# Patient Record
Sex: FEMALE | Race: OTHER | HISPANIC OR LATINO | URBAN - METROPOLITAN AREA
[De-identification: names, ages, dates, MRNs, and addresses within clinical notes are randomized per-mention and may not be internally consistent; named-entity substitution may affect disease eponyms.]

---

## 2020-10-20 ENCOUNTER — INPATIENT (INPATIENT)
Facility: HOSPITAL | Age: 65
LOS: 3 days | Discharge: HOME | DRG: 418 | End: 2020-10-24
Attending: SURGERY | Admitting: SURGERY
Payer: MEDICARE

## 2020-10-20 VITALS
HEART RATE: 96 BPM | TEMPERATURE: 98 F | RESPIRATION RATE: 18 BRPM | OXYGEN SATURATION: 98 % | DIASTOLIC BLOOD PRESSURE: 72 MMHG | WEIGHT: 125 LBS | SYSTOLIC BLOOD PRESSURE: 135 MMHG

## 2020-10-20 DIAGNOSIS — K80.50 CALCULUS OF BILE DUCT WITHOUT CHOLANGITIS OR CHOLECYSTITIS WITHOUT OBSTRUCTION: ICD-10-CM

## 2020-10-20 DIAGNOSIS — Z29.9 ENCOUNTER FOR PROPHYLACTIC MEASURES, UNSPECIFIED: ICD-10-CM

## 2020-10-20 DIAGNOSIS — K80.20 CALCULUS OF GALLBLADDER WITHOUT CHOLECYSTITIS WITHOUT OBSTRUCTION: ICD-10-CM

## 2020-10-20 LAB
ALBUMIN SERPL ELPH-MCNC: 3.6 G/DL — SIGNIFICANT CHANGE UP (ref 3.5–5)
ALP SERPL-CCNC: 228 U/L — HIGH (ref 40–120)
ALT FLD-CCNC: 328 U/L DA — HIGH (ref 10–60)
ANION GAP SERPL CALC-SCNC: 7 MMOL/L — SIGNIFICANT CHANGE UP (ref 5–17)
APPEARANCE UR: CLEAR — SIGNIFICANT CHANGE UP
AST SERPL-CCNC: 136 U/L — HIGH (ref 10–40)
BASOPHILS # BLD AUTO: 0.01 K/UL — SIGNIFICANT CHANGE UP (ref 0–0.2)
BASOPHILS NFR BLD AUTO: 0.2 % — SIGNIFICANT CHANGE UP (ref 0–2)
BILIRUB SERPL-MCNC: 4.3 MG/DL — HIGH (ref 0.2–1.2)
BILIRUB UR-MCNC: ABNORMAL
BUN SERPL-MCNC: 8 MG/DL — SIGNIFICANT CHANGE UP (ref 7–18)
CALCIUM SERPL-MCNC: 8.7 MG/DL — SIGNIFICANT CHANGE UP (ref 8.4–10.5)
CHLORIDE SERPL-SCNC: 109 MMOL/L — HIGH (ref 96–108)
CO2 SERPL-SCNC: 25 MMOL/L — SIGNIFICANT CHANGE UP (ref 22–31)
COLOR SPEC: YELLOW — SIGNIFICANT CHANGE UP
CREAT SERPL-MCNC: 0.53 MG/DL — SIGNIFICANT CHANGE UP (ref 0.5–1.3)
DIFF PNL FLD: ABNORMAL
EOSINOPHIL # BLD AUTO: 0.18 K/UL — SIGNIFICANT CHANGE UP (ref 0–0.5)
EOSINOPHIL NFR BLD AUTO: 3.4 % — SIGNIFICANT CHANGE UP (ref 0–6)
EPI CELLS # UR: ABNORMAL /HPF
GLUCOSE SERPL-MCNC: 93 MG/DL — SIGNIFICANT CHANGE UP (ref 70–99)
GLUCOSE UR QL: NEGATIVE — SIGNIFICANT CHANGE UP
HCT VFR BLD CALC: 36.7 % — SIGNIFICANT CHANGE UP (ref 34.5–45)
HGB BLD-MCNC: 12.2 G/DL — SIGNIFICANT CHANGE UP (ref 11.5–15.5)
IMM GRANULOCYTES NFR BLD AUTO: 0.2 % — SIGNIFICANT CHANGE UP (ref 0–1.5)
KETONES UR-MCNC: ABNORMAL
LEUKOCYTE ESTERASE UR-ACNC: ABNORMAL
LIDOCAIN IGE QN: 171 U/L — SIGNIFICANT CHANGE UP (ref 73–393)
LYMPHOCYTES # BLD AUTO: 0.81 K/UL — LOW (ref 1–3.3)
LYMPHOCYTES # BLD AUTO: 15.4 % — SIGNIFICANT CHANGE UP (ref 13–44)
MCHC RBC-ENTMCNC: 30.6 PG — SIGNIFICANT CHANGE UP (ref 27–34)
MCHC RBC-ENTMCNC: 33.2 GM/DL — SIGNIFICANT CHANGE UP (ref 32–36)
MCV RBC AUTO: 92 FL — SIGNIFICANT CHANGE UP (ref 80–100)
MONOCYTES # BLD AUTO: 0.43 K/UL — SIGNIFICANT CHANGE UP (ref 0–0.9)
MONOCYTES NFR BLD AUTO: 8.2 % — SIGNIFICANT CHANGE UP (ref 2–14)
NEUTROPHILS # BLD AUTO: 3.81 K/UL — SIGNIFICANT CHANGE UP (ref 1.8–7.4)
NEUTROPHILS NFR BLD AUTO: 72.6 % — SIGNIFICANT CHANGE UP (ref 43–77)
NITRITE UR-MCNC: NEGATIVE — SIGNIFICANT CHANGE UP
NRBC # BLD: 0 /100 WBCS — SIGNIFICANT CHANGE UP (ref 0–0)
PH UR: 6.5 — SIGNIFICANT CHANGE UP (ref 5–8)
PLATELET # BLD AUTO: 153 K/UL — SIGNIFICANT CHANGE UP (ref 150–400)
POTASSIUM SERPL-MCNC: 3.6 MMOL/L — SIGNIFICANT CHANGE UP (ref 3.5–5.3)
POTASSIUM SERPL-SCNC: 3.6 MMOL/L — SIGNIFICANT CHANGE UP (ref 3.5–5.3)
PROT SERPL-MCNC: 7.9 G/DL — SIGNIFICANT CHANGE UP (ref 6–8.3)
PROT UR-MCNC: 100
RBC # BLD: 3.99 M/UL — SIGNIFICANT CHANGE UP (ref 3.8–5.2)
RBC # FLD: 14.4 % — SIGNIFICANT CHANGE UP (ref 10.3–14.5)
RBC CASTS # UR COMP ASSIST: ABNORMAL /HPF (ref 0–2)
SARS-COV-2 RNA SPEC QL NAA+PROBE: SIGNIFICANT CHANGE UP
SODIUM SERPL-SCNC: 141 MMOL/L — SIGNIFICANT CHANGE UP (ref 135–145)
SP GR SPEC: 1.02 — SIGNIFICANT CHANGE UP (ref 1.01–1.02)
TROPONIN I SERPL-MCNC: <0.015 NG/ML — SIGNIFICANT CHANGE UP (ref 0–0.04)
UROBILINOGEN FLD QL: 4
WBC # BLD: 5.25 K/UL — SIGNIFICANT CHANGE UP (ref 3.8–10.5)
WBC # FLD AUTO: 5.25 K/UL — SIGNIFICANT CHANGE UP (ref 3.8–10.5)
WBC UR QL: SIGNIFICANT CHANGE UP /HPF (ref 0–5)

## 2020-10-20 PROCEDURE — 76705 ECHO EXAM OF ABDOMEN: CPT | Mod: 26

## 2020-10-20 PROCEDURE — 99285 EMERGENCY DEPT VISIT HI MDM: CPT

## 2020-10-20 PROCEDURE — 99222 1ST HOSP IP/OBS MODERATE 55: CPT

## 2020-10-20 RX ORDER — KETOROLAC TROMETHAMINE 30 MG/ML
15 SYRINGE (ML) INJECTION ONCE
Refills: 0 | Status: DISCONTINUED | OUTPATIENT
Start: 2020-10-20 | End: 2020-10-20

## 2020-10-20 RX ORDER — SODIUM CHLORIDE 9 MG/ML
1000 INJECTION, SOLUTION INTRAVENOUS
Refills: 0 | Status: DISCONTINUED | OUTPATIENT
Start: 2020-10-20 | End: 2020-10-24

## 2020-10-20 RX ORDER — KETOROLAC TROMETHAMINE 30 MG/ML
30 SYRINGE (ML) INJECTION EVERY 6 HOURS
Refills: 0 | Status: DISCONTINUED | OUTPATIENT
Start: 2020-10-20 | End: 2020-10-22

## 2020-10-20 RX ORDER — CIPROFLOXACIN LACTATE 400MG/40ML
400 VIAL (ML) INTRAVENOUS EVERY 12 HOURS
Refills: 0 | Status: DISCONTINUED | OUTPATIENT
Start: 2020-10-21 | End: 2020-10-22

## 2020-10-20 RX ORDER — METRONIDAZOLE 500 MG
500 TABLET ORAL EVERY 8 HOURS
Refills: 0 | Status: DISCONTINUED | OUTPATIENT
Start: 2020-10-20 | End: 2020-10-22

## 2020-10-20 RX ORDER — SODIUM CHLORIDE 9 MG/ML
1000 INJECTION INTRAMUSCULAR; INTRAVENOUS; SUBCUTANEOUS ONCE
Refills: 0 | Status: COMPLETED | OUTPATIENT
Start: 2020-10-20 | End: 2020-10-20

## 2020-10-20 RX ORDER — CIPROFLOXACIN LACTATE 400MG/40ML
400 VIAL (ML) INTRAVENOUS ONCE
Refills: 0 | Status: COMPLETED | OUTPATIENT
Start: 2020-10-20 | End: 2020-10-20

## 2020-10-20 RX ORDER — ENOXAPARIN SODIUM 100 MG/ML
40 INJECTION SUBCUTANEOUS DAILY
Refills: 0 | Status: DISCONTINUED | OUTPATIENT
Start: 2020-10-20 | End: 2020-10-22

## 2020-10-20 RX ORDER — ACETAMINOPHEN 500 MG
650 TABLET ORAL EVERY 6 HOURS
Refills: 0 | Status: DISCONTINUED | OUTPATIENT
Start: 2020-10-20 | End: 2020-10-22

## 2020-10-20 RX ORDER — OXYCODONE AND ACETAMINOPHEN 5; 325 MG/1; MG/1
1 TABLET ORAL EVERY 6 HOURS
Refills: 0 | Status: DISCONTINUED | OUTPATIENT
Start: 2020-10-20 | End: 2020-10-22

## 2020-10-20 RX ORDER — METRONIDAZOLE 500 MG
500 TABLET ORAL ONCE
Refills: 0 | Status: COMPLETED | OUTPATIENT
Start: 2020-10-20 | End: 2020-10-20

## 2020-10-20 RX ADMIN — Medication 15 MILLIGRAM(S): at 15:13

## 2020-10-20 RX ADMIN — SODIUM CHLORIDE 100 MILLILITER(S): 9 INJECTION, SOLUTION INTRAVENOUS at 21:40

## 2020-10-20 RX ADMIN — SODIUM CHLORIDE 1000 MILLILITER(S): 9 INJECTION INTRAMUSCULAR; INTRAVENOUS; SUBCUTANEOUS at 15:13

## 2020-10-20 RX ADMIN — Medication 15 MILLIGRAM(S): at 15:43

## 2020-10-20 RX ADMIN — Medication 100 MILLIGRAM(S): at 17:44

## 2020-10-20 RX ADMIN — Medication 500 MILLIGRAM(S): at 22:55

## 2020-10-20 RX ADMIN — Medication 200 MILLIGRAM(S): at 17:45

## 2020-10-20 NOTE — H&P ADULT - ASSESSMENT
Patient, 65 year old female from home with no significant PMH presents to the ED with RUQ and right flank pain for 2 days. Patient, 65 year old female from home with no significant PMH presents to the ED with RUQ and right flank pain for 2 days. USG RUQ showed multiple mobile gallstones in the gallbladder. No evidence for thickened gallbladder wall, pericholecystic fluid or ultrasonic Gomes's sign.    Patient is being admitted to medicine for RUQ pain and choledocholithiasis.

## 2020-10-20 NOTE — CONSULT NOTE ADULT - SUBJECTIVE AND OBJECTIVE BOX
HPI:  Patient, 65 year old female from home with no significant PMH presents to the ED with RUQ and right flank pain for 2 days. According to patient, the pain started 2 days ago and initially was bearable but increased in intensity now which is why she came to the hospital. She denies any association with eating and says she was walking when it happened. She took aleve yesterday with some relief but nothing today. Denies fevers, nausea, vomiting. Patient endorses chills yesterday and constipation occasionally. She does not follow with any doctors. Patient also complains of having dark urine lately but no dysuria.   < from: US Hepatic & Pancreatic (10.20.20 @ 16:53) >  INTERPRETATION:  Right upper quadrant abdominal ultrasound    Indication: Right upper quadrant abdominal pain and transaminitis.    Right upper quadrant abdominal ultrasound is performed without a previous examination for comparison. The liver spans 15.4 cm which is within normal limits in size. There is a 2.6 cm right hepatic cyst. Duplex Doppler interrogation of the main portal vein demonstrates normal hepatopedal flow.    The common bile duct measures 6.7 mm in caliber which is within normal limits for age.    Multiple mobile gallstones in the gallbladder. No evidence for thickened gallbladder wall, pericholecystic fluid or ultrasonic Gomes's sign.    The visualized pancreas appears grossly unremarkable.    The right kidney measures 10.4 cm in length which is within normal limits in size. The right kidney appears grossly unremarkable without hydronephrosis.    The visualized IVC and aorta appear grossly unremarkable. No ascites is detected.    Impression: Cholelithiasis without evidence for acute cholecystitis.    Right hepatic cyst.      < end of copied text >    In ED, patient is in NAD.   Vitals:  /72  HR 96  Afebrile  RR 18 (20 Oct 2020 19:34)      PAST MEDICAL & SURGICAL HISTORY:  No pertinent past medical history    No significant past surgical history        Vital Signs Last 24 Hrs  T(C): 36.8 (20 Oct 2020 21:19), Max: 36.8 (20 Oct 2020 21:19)  T(F): 98.3 (20 Oct 2020 21:19), Max: 98.3 (20 Oct 2020 21:19)  HR: 98 (20 Oct 2020 21:19) (96 - 98)  BP: 136/82 (20 Oct 2020 21:19) (118/74 - 136/82)  BP(mean): --  RR: 16 (20 Oct 2020 21:19) (16 - 18)  SpO2: 98% (20 Oct 2020 21:19) (98% - 98%)                          12.2   5.25  )-----------( 153      ( 20 Oct 2020 15:13 )             36.7     10-20    141  |  109<H>  |  8   ----------------------------<  93  3.6   |  25  |  0.53    Ca    8.7      20 Oct 2020 15:13    TPro  7.9  /  Alb  3.6  /  TBili  4.3<H>  /  DBili  x   /  AST  136<H>  /  ALT  328<H>  /  AlkPhos  228<H>  10-20        PHYSICAL EXAM  General: WN/WD NAD  Neurology: A&Ox3, nonfocal, CARRANZA x 4  Respiratory: CTA B/L  CV: RRR, S1S2, no murmurs, rubs or gallops  Abdominal: Soft, mild RUQ tenderness, ND   Extremities: No edema, + peripheral pulses        ASSESSMENT/ PLAN:  recommend  npo, hydrate, cont abx, GI consult/poss MRCP  f/u repeat am CMP, CBC

## 2020-10-20 NOTE — H&P ADULT - ATTENDING COMMENTS
seen and exaamined  rt u q abd pain from a few days worsened today without nausea vomiting.    vsstable afebrile   abd soft bs nml mild rt uq tenderness  labs noted  elevated lfts and bili   sono colelithiasis   GI and surgery  HIDA scan

## 2020-10-20 NOTE — ED ADULT NURSE NOTE - OBJECTIVE STATEMENT
as per the pt " I have rt lower and back pain for 3 days ". Pt c/o N/V. Pt denies diarrhea. No acute distress noted.

## 2020-10-20 NOTE — ED PROVIDER NOTE - CLINICAL SUMMARY MEDICAL DECISION MAKING FREE TEXT BOX
US shows cholelithiasis without cholecystitis. TBili 4.7 with transaminitis. No leukocytosis or L shift. Low suspicion of cholangitis. Presentation concerning for choledocholithiasis. Will admit/Gi consult/IV antibiotics.

## 2020-10-20 NOTE — H&P ADULT - PROBLEM SELECTOR PLAN 2
[ ] Previous VTE                                    3  [ ] Thrombophilia                                  2  [ ] Lower limb paralysis                        2  (unable to hold up >15 seconds)    [ ] Current Cancer (within 6 months)        2   [ ] Immobilization > 24 hrs                    1  [ ] ICU/CCU stay > 24 hrs                      1  [x ] Age > 60                                         1   lovenox for dvt prophylaxis

## 2020-10-20 NOTE — ED PROVIDER NOTE - OBJECTIVE STATEMENT
65 year-old female, no significant PMHx, presents with cc R flank and abdominal pain x 3 days. Gradual onset of of aching R flank pain radiating to RUQ. Pain is continuous, no aggravating or alleviating factors, getting progressively worse. Associated with discomfort during urination. Denies any fever, chills, N/V/D/C, vaginal bleeding or any other complaints.

## 2020-10-20 NOTE — ED PROVIDER NOTE - ATTENDING CONTRIBUTION TO CARE
I completed an independent physical examination.   I have signed out the follow up of any pending tests (i.e. labs, radiological studies) to the PA/NP.  I have discussed the patient’s plan of care and disposition with the PA/NP    patient with abd pain. Abd labs, U/A, imaging.

## 2020-10-20 NOTE — H&P ADULT - PROBLEM SELECTOR PLAN 1
Patient presented with RUQ and flank pain  On admission, afebrile, no leucocytosis  Total bili 4.3, , ALT//136  LFTs show obstructive pattern  USG abdomen shows cholelithiasis without evidence for acute cholecystitis.  s/p one dose of cipro and flagyl in ED   since pt has no fevers, less likely cholecystitis   continue with cipro and flagyl  f/u direct and indirect bili  c/w IVF and keep pt NPO for now  GI Dr. Antoine consulted  patient might need ERCP Patient presented with RUQ and flank pain  On admission, afebrile, no leucocytosis  Total bili 4.3, , ALT//136  LFTs show obstructive pattern  USG abdomen shows cholelithiasis without evidence for acute cholecystitis.  s/p one dose of cipro and flagyl in ED   since pt has no fevers, less likely cholecystitis   continue with cipro and flagyl  f/u direct and indirect bili  c/w IVF and keep pt NPO for now  [facility] with [facility] and [facility]  On admission, afebrile, no leucocytosis  Total bili 4.3, , ALT//136  LFTs show obstructive pattern  USG abdomen shows cholelithiasis without evidence for acute cholecystitis.  s/p one dose of cipro and flagyl in ED   since pt has no fevers, less likely cholecystitis   continue with cipro and flagyl  f/u direct and indirect bili  c/w IVF and keep pt NPO for now  get surgery consult  GI Dr. Antoine consulted  HIDA scan

## 2020-10-20 NOTE — ED PROVIDER NOTE - PROGRESS NOTE DETAILS
US shows cholelithiasis without cholecystitis. TBili 4.7 with transaminitis. No leukocytosis or L shift. Low suspicion of cholangitis. Presentation concerning for choledocholithiasis. Discussed with on-call GI Dr Etienne. Recommends admission and MRCP +/- ERCP. Recommends consultation with erither GI Dr Zamora or Dr Antoine. Will give IV Cipro/Flagyl and admit. Discussed with Dr De Luna and MAR. Discussed with GI Dr Antoine. He will consult tomorrow.

## 2020-10-20 NOTE — H&P ADULT - HISTORY OF PRESENT ILLNESS
Patient, 65 year old female from home with no significant PMH presents to the ED with RUQ and right flank pain for 2 days. According to patient, the pain started 2 days ago and initially was bearable but increased in intensity now which is why she came to the hospital. She denies any association with eating and says she was walking when it happened. She took aleve yesterday with some relief but nothing today. Denies fevers, nausea, vomiting. Patient endorses chills yesterday and constipation occasionally. She does not follow with any doctors. Patient also complains of having dark urine lately but no dysuria.     In ED, patient is in NAD.   Vitals:  /72  HR 96  Afebrile  RR 18

## 2020-10-20 NOTE — H&P ADULT - EXTREMITIES
Last OV: 2/14/19    [facility] requesting order for replacement CPAP.  Patient will need updated face to face visit with Georges Nelson PA-C before we can place order for replacement machine.    Spoke with patient.  We canceled 1 year FUV with Dr. Ramirez for 2/19/2020 and patient is aware.  Patient rescheduled to see Georges on 9/9/19 at 1:30pm.  Patient is currently using loaner form [facility].  She has no further questions or concerns at this time.    detailed exam

## 2020-10-21 ENCOUNTER — TRANSCRIPTION ENCOUNTER (OUTPATIENT)
Age: 65
End: 2020-10-21

## 2020-10-21 DIAGNOSIS — R79.89 OTHER SPECIFIED ABNORMAL FINDINGS OF BLOOD CHEMISTRY: ICD-10-CM

## 2020-10-21 DIAGNOSIS — R74.01 ELEVATION OF LEVELS OF LIVER TRANSAMINASE LEVELS: ICD-10-CM

## 2020-10-21 DIAGNOSIS — N39.0 URINARY TRACT INFECTION, SITE NOT SPECIFIED: ICD-10-CM

## 2020-10-21 LAB
A1C WITH ESTIMATED AVERAGE GLUCOSE RESULT: 5.4 % — SIGNIFICANT CHANGE UP (ref 4–5.6)
ALBUMIN SERPL ELPH-MCNC: 3 G/DL — LOW (ref 3.5–5)
ALP SERPL-CCNC: 178 U/L — HIGH (ref 40–120)
ALT FLD-CCNC: 232 U/L DA — HIGH (ref 10–60)
ANION GAP SERPL CALC-SCNC: 6 MMOL/L — SIGNIFICANT CHANGE UP (ref 5–17)
ANISOCYTOSIS BLD QL: SLIGHT — SIGNIFICANT CHANGE UP
AST SERPL-CCNC: 80 U/L — HIGH (ref 10–40)
BASOPHILS # BLD AUTO: 0 K/UL — SIGNIFICANT CHANGE UP (ref 0–0.2)
BASOPHILS NFR BLD AUTO: 0 % — SIGNIFICANT CHANGE UP (ref 0–2)
BILIRUB DIRECT SERPL-MCNC: 1.2 MG/DL — HIGH (ref 0–0.2)
BILIRUB INDIRECT FLD-MCNC: 1 MG/DL — SIGNIFICANT CHANGE UP (ref 0.2–1)
BILIRUB SERPL-MCNC: 2.2 MG/DL — HIGH (ref 0.2–1.2)
BILIRUB SERPL-MCNC: 2.2 MG/DL — HIGH (ref 0.2–1.2)
BLD GP AB SCN SERPL QL: SIGNIFICANT CHANGE UP
BUN SERPL-MCNC: 5 MG/DL — LOW (ref 7–18)
CALCIUM SERPL-MCNC: 8.3 MG/DL — LOW (ref 8.4–10.5)
CHLORIDE SERPL-SCNC: 112 MMOL/L — HIGH (ref 96–108)
CO2 SERPL-SCNC: 24 MMOL/L — SIGNIFICANT CHANGE UP (ref 22–31)
CREAT SERPL-MCNC: 0.48 MG/DL — LOW (ref 0.5–1.3)
CULTURE RESULTS: SIGNIFICANT CHANGE UP
EOSINOPHIL # BLD AUTO: 0.15 K/UL — SIGNIFICANT CHANGE UP (ref 0–0.5)
EOSINOPHIL NFR BLD AUTO: 3 % — SIGNIFICANT CHANGE UP (ref 0–6)
ESTIMATED AVERAGE GLUCOSE: 108 MG/DL — SIGNIFICANT CHANGE UP (ref 68–114)
GLUCOSE SERPL-MCNC: 110 MG/DL — HIGH (ref 70–99)
HCT VFR BLD CALC: 31.3 % — LOW (ref 34.5–45)
HCV AB S/CO SERPL IA: 0.08 S/CO — SIGNIFICANT CHANGE UP (ref 0–0.99)
HCV AB SERPL-IMP: SIGNIFICANT CHANGE UP
HGB BLD-MCNC: 10.5 G/DL — LOW (ref 11.5–15.5)
HYPOCHROMIA BLD QL: SLIGHT — SIGNIFICANT CHANGE UP
LACTATE SERPL-SCNC: 0.5 MMOL/L — LOW (ref 0.7–2)
LYMPHOCYTES # BLD AUTO: 1.33 K/UL — SIGNIFICANT CHANGE UP (ref 1–3.3)
LYMPHOCYTES # BLD AUTO: 26 % — SIGNIFICANT CHANGE UP (ref 13–44)
MAGNESIUM SERPL-MCNC: 2.3 MG/DL — SIGNIFICANT CHANGE UP (ref 1.6–2.6)
MANUAL SMEAR VERIFICATION: SIGNIFICANT CHANGE UP
MCHC RBC-ENTMCNC: 30.6 PG — SIGNIFICANT CHANGE UP (ref 27–34)
MCHC RBC-ENTMCNC: 33.5 GM/DL — SIGNIFICANT CHANGE UP (ref 32–36)
MCV RBC AUTO: 91.3 FL — SIGNIFICANT CHANGE UP (ref 80–100)
MONOCYTES # BLD AUTO: 0.15 K/UL — SIGNIFICANT CHANGE UP (ref 0–0.9)
MONOCYTES NFR BLD AUTO: 3 % — SIGNIFICANT CHANGE UP (ref 2–14)
NEUTROPHILS # BLD AUTO: 3.49 K/UL — SIGNIFICANT CHANGE UP (ref 1.8–7.4)
NEUTROPHILS NFR BLD AUTO: 68 % — SIGNIFICANT CHANGE UP (ref 43–77)
NRBC # BLD: 0 /100 — SIGNIFICANT CHANGE UP (ref 0–0)
PHOSPHATE SERPL-MCNC: 2.4 MG/DL — LOW (ref 2.5–4.5)
PLAT MORPH BLD: NORMAL — SIGNIFICANT CHANGE UP
PLATELET # BLD AUTO: 143 K/UL — LOW (ref 150–400)
PLATELET COUNT - ESTIMATE: ABNORMAL
POIKILOCYTOSIS BLD QL AUTO: SLIGHT — SIGNIFICANT CHANGE UP
POTASSIUM SERPL-MCNC: 3.7 MMOL/L — SIGNIFICANT CHANGE UP (ref 3.5–5.3)
POTASSIUM SERPL-SCNC: 3.7 MMOL/L — SIGNIFICANT CHANGE UP (ref 3.5–5.3)
PROT SERPL-MCNC: 6.7 G/DL — SIGNIFICANT CHANGE UP (ref 6–8.3)
RBC # BLD: 3.43 M/UL — LOW (ref 3.8–5.2)
RBC # FLD: 14.5 % — SIGNIFICANT CHANGE UP (ref 10.3–14.5)
RBC BLD AUTO: ABNORMAL
SARS-COV-2 IGG SERPL QL IA: NEGATIVE — SIGNIFICANT CHANGE UP
SARS-COV-2 IGM SERPL IA-ACNC: <0.1 INDEX — SIGNIFICANT CHANGE UP
SODIUM SERPL-SCNC: 142 MMOL/L — SIGNIFICANT CHANGE UP (ref 135–145)
SPECIMEN SOURCE: SIGNIFICANT CHANGE UP
TROPONIN I SERPL-MCNC: <0.015 NG/ML — SIGNIFICANT CHANGE UP (ref 0–0.04)
TSH SERPL-MCNC: 10.2 UU/ML — HIGH (ref 0.34–4.82)
VIT B12 SERPL-MCNC: 393 PG/ML — SIGNIFICANT CHANGE UP (ref 232–1245)
WBC # BLD: 5.13 K/UL — SIGNIFICANT CHANGE UP (ref 3.8–10.5)
WBC # FLD AUTO: 5.13 K/UL — SIGNIFICANT CHANGE UP (ref 3.8–10.5)

## 2020-10-21 PROCEDURE — 78227 HEPATOBIL SYST IMAGE W/DRUG: CPT | Mod: 26

## 2020-10-21 PROCEDURE — 99232 SBSQ HOSP IP/OBS MODERATE 35: CPT

## 2020-10-21 PROCEDURE — 74182 MRI ABDOMEN W/CONTRAST: CPT | Mod: 26

## 2020-10-21 RX ORDER — SODIUM CHLORIDE 9 MG/ML
1000 INJECTION, SOLUTION INTRAVENOUS
Refills: 0 | Status: DISCONTINUED | OUTPATIENT
Start: 2020-10-21 | End: 2020-10-21

## 2020-10-21 RX ORDER — SODIUM CHLORIDE 9 MG/ML
1000 INJECTION, SOLUTION INTRAVENOUS
Refills: 0 | Status: DISCONTINUED | OUTPATIENT
Start: 2020-10-21 | End: 2020-10-22

## 2020-10-21 RX ORDER — MORPHINE SULFATE 50 MG/1
2 CAPSULE, EXTENDED RELEASE ORAL ONCE
Refills: 0 | Status: DISCONTINUED | OUTPATIENT
Start: 2020-10-21 | End: 2020-10-21

## 2020-10-21 RX ADMIN — Medication 30 MILLIGRAM(S): at 22:00

## 2020-10-21 RX ADMIN — Medication 200 MILLIGRAM(S): at 18:13

## 2020-10-21 RX ADMIN — OXYCODONE AND ACETAMINOPHEN 1 TABLET(S): 5; 325 TABLET ORAL at 09:32

## 2020-10-21 RX ADMIN — Medication 500 MILLIGRAM(S): at 15:29

## 2020-10-21 RX ADMIN — Medication 500 MILLIGRAM(S): at 21:22

## 2020-10-21 RX ADMIN — OXYCODONE AND ACETAMINOPHEN 1 TABLET(S): 5; 325 TABLET ORAL at 08:32

## 2020-10-21 RX ADMIN — MORPHINE SULFATE 2 MILLIGRAM(S): 50 CAPSULE, EXTENDED RELEASE ORAL at 12:20

## 2020-10-21 RX ADMIN — Medication 500 MILLIGRAM(S): at 06:06

## 2020-10-21 RX ADMIN — Medication 30 MILLIGRAM(S): at 21:21

## 2020-10-21 RX ADMIN — Medication 200 MILLIGRAM(S): at 06:05

## 2020-10-21 NOTE — PROGRESS NOTE ADULT - SUBJECTIVE AND OBJECTIVE BOX
HPI:  Patient, 65 year old female from home with no significant PMH presents to the ED with RUQ and right flank pain for 2 days. According to patient, the pain started 2 days ago and initially was bearable but increased in intensity now which is why she came to the hospital. She denies any association with eating and says she was walking when it happened. She took aleve yesterday with some relief but nothing today. Denies fevers, nausea, vomiting. Patient endorses chills yesterday and constipation occasionally. She does not follow with any doctors. Patient also complains of having dark urine lately but no dysuria.     In ED, patient is in NAD.   Vitals:  /72  HR 96  Afebrile  RR 18 (20 Oct 2020 19:34)      Patient is a 65y old  Female who presents with a chief complaint of abdominal pain (21 Oct 2020 12:59)      INTERVAL HPI/OVERNIGHT EVENTS:  T(C): 36.6 (10-21-20 @ 04:56), Max: 36.8 (10-20-20 @ 21:19)  HR: 68 (10-21-20 @ 04:56) (68 - 98)  BP: 106/58 (10-21-20 @ 04:56) (106/58 - 136/82)  RR: 17 (10-21-20 @ 04:56) (16 - 18)  SpO2: 98% (10-21-20 @ 04:56) (98% - 98%)  Wt(kg): --  I&O's Summary      REVIEW OF SYSTEMS: denies fever, chills, SOB, palpitations, chest pain, abdominal pain, nausea, vomitting, diarrhea, constipation, dizziness    MEDICATIONS  (STANDING):  ciprofloxacin   IVPB 400 milliGRAM(s) IV Intermittent every 12 hours  dextrose 5% + sodium chloride 0.9%. 1000 milliLiter(s) (100 mL/Hr) IV Continuous <Continuous>  enoxaparin Injectable 40 milliGRAM(s) SubCutaneous daily  metroNIDAZOLE    Tablet 500 milliGRAM(s) Oral every 8 hours    MEDICATIONS  (PRN):  acetaminophen   Tablet .. 650 milliGRAM(s) Oral every 6 hours PRN Mild Pain (1 - 3)  ketorolac   Injectable 30 milliGRAM(s) IV Push every 6 hours PRN Severe Pain (7 - 10)  oxycodone    5 mG/acetaminophen 325 mG 1 Tablet(s) Oral every 6 hours PRN Moderate Pain (4 - 6)      PHYSICAL EXAM:  GENERAL: NAD, well-groomed, well-developed  HEAD:  Atraumatic, Normocephalic  EYES: EOMI, PERRLA, conjunctiva and sclera clear  ENMT: No tonsillar erythema, exudates, or enlargement; Moist mucous membranes, Good dentition, No lesions  NECK: Supple, No JVD, Normal thyroid  NERVOUS SYSTEM:  Alert & Oriented X3, Good concentration; Motor Strength 5/5 B/L upper and lower extremities; DTRs 2+ intact and symmetric  CHEST/LUNG: Clear to percussion bilaterally; No rales, rhonchi, wheezing, or rubs  HEART: Regular rate and rhythm; No murmurs, rubs, or gallops  ABDOMEN: Soft, Nontender, Nondistended; Bowel sounds present  EXTREMITIES:  2+ Peripheral Pulses, No clubbing, cyanosis, or edema  LYMPH: No lymphadenopathy noted  SKIN: No rashes or lesions  LABS:                        10.5   5.13  )-----------( 143      ( 21 Oct 2020 06:19 )             31.3     10-    142  |  112<H>  |  5<L>  ----------------------------<  110<H>  3.7   |  24  |  0.48<L>    Ca    8.3<L>      21 Oct 2020 06:19  Phos  2.4     10-21  Mg     2.3     10-21    TPro  6.7  /  Alb  3.0<L>  /  TBili  2.2<H>  /  DBili  1.2<H>  /  AST  80<H>  /  ALT  232<H>  /  AlkPhos  178<H>  10-21      Urinalysis Basic - ( 20 Oct 2020 15:13 )    Color: Yellow / Appearance: Clear / S.020 / pH: x  Gluc: x / Ketone: Moderate  / Bili: Moderate / Urobili: 4   Blood: x / Protein: 100 / Nitrite: Negative   Leuk Esterase: Small / RBC: 2-5 /HPF / WBC 3-5 /HPF   Sq Epi: x / Non Sq Epi: Occasional /HPF / Bacteria: x      CAPILLARY BLOOD GLUCOSE            Urinalysis Basic - ( 20 Oct 2020 15:13 )    Color: Yellow / Appearance: Clear / S.020 / pH: x  Gluc: x / Ketone: Moderate  / Bili: Moderate / Urobili: 4   Blood: x / Protein: 100 / Nitrite: Negative   Leuk Esterase: Small / RBC: 2-5 /HPF / WBC 3-5 /HPF   Sq Epi: x / Non Sq Epi: Occasional /HPF / Bacteria: x

## 2020-10-21 NOTE — CONSULT NOTE ADULT - SUBJECTIVE AND OBJECTIVE BOX
Patient is a 65y old  Female who presents with a chief complaint of abdominal pain (20 Oct 2020 22:06)     .     HPI:            REVIEW OF SYSTEMS  Constitutional:   No fever, no fatigue, no pallor, no night sweats, no weight loss.  HEENT:   No eye pain, no vision changes, no icterus, no mouth ulcers.  Respiratory:   No shortness of breath, no cough, no respiratory distress.   Cardiovascular:   No chest pain, no palpitations.   Gastrointestinal: No abdominal pain, no nausea, no vomiting , no diahrrea, no constipation, no hematochezia,no melena.  Skin:   No rashes, no jaundice, no eczema.   Musculoskeletal:   No joint pain, no swelling, no myalgia.   Neurologic:   No headache, no seizure, no weakness.   Genitourinary:   No dysuria, no decreased urine output.  Psychiatric:  No depression, no anxiety,   Endocrine:   No thyroid disease, no diabetes.  Heme/Lymphatic:   No anemia, no blood transfusions, no lymph node enlargement, no bleeding, no bruising.  ___________________________________________________________________________________________  Allergies    ampicillin (Unknown)    Intolerances      MEDICATIONS  (STANDING):  ciprofloxacin   IVPB 400 milliGRAM(s) IV Intermittent every 12 hours  dextrose 5% + sodium chloride 0.9%. 1000 milliLiter(s) (100 mL/Hr) IV Continuous <Continuous>  enoxaparin Injectable 40 milliGRAM(s) SubCutaneous daily  metroNIDAZOLE    Tablet 500 milliGRAM(s) Oral every 8 hours    MEDICATIONS  (PRN):  acetaminophen   Tablet .. 650 milliGRAM(s) Oral every 6 hours PRN Mild Pain (1 - 3)  ketorolac   Injectable 30 milliGRAM(s) IV Push every 6 hours PRN Severe Pain (7 - 10)  oxycodone    5 mG/acetaminophen 325 mG 1 Tablet(s) Oral every 6 hours PRN Moderate Pain (4 - 6)      PAST MEDICAL & SURGICAL HISTORY:  No pertinent past medical history    No significant past surgical history      FAMILY HISTORY:  No pertinent family history in first degree relatives      Social History: No hsitory of : Tobacco use, IVDA, EToH  ______________________________________________________________________________________    PHYSICAL EXAM    Daily     Daily Weight in k.8 (20 Oct 2020 21:19)  BMI:   Change in Weight:  Vital Signs Last 24 Hrs  T(C): 36.6 (21 Oct 2020 04:56), Max: 36.8 (20 Oct 2020 21:19)  T(F): 97.8 (21 Oct 2020 04:56), Max: 98.3 (20 Oct 2020 21:19)  HR: 68 (21 Oct 2020 04:56) (68 - 98)  BP: 106/58 (21 Oct 2020 04:56) (106/58 - 136/82)  BP(mean): --  RR: 17 (21 Oct 2020 04:56) (16 - 18)  SpO2: 98% (21 Oct 2020 04:56) (98% - 98%)    General:  Well developed, well nourished, alert and active, no pallor, NAD.  HEENT:    Normal appearance of conjunctiva, ears, nose, lips, oropharynx, and oral mucosa, anicteric.  Neck:  No masses, no asymmetry.  Lymph Nodes:  No lymphadenopathy.   Cardiovascular:  RRR normal S1/S2, no murmur.  Respiratory:  CTA B/L, normal respiratory effort.   Abdominal:   soft, no masses or tenderness, normoactive BS, NT/ND, no HSM.  Extremities:   No clubbing or cyanosis, normal capillary refill, no edema.   Skin:   No rash, jaundice, lesions, eczema.   Musculoskeletal:  No joint swelling, erythema or tenderness.   Neuro: No focal deficits.   Other:   _______________________________________________________________________________________________  Lab Results:                          10.5   x     )-----------( 143      ( 21 Oct 2020 06:19 )             31.3     10-21    142  |  112<H>  |  5<L>  ----------------------------<  110<H>  3.7   |  24  |  0.48<L>    Ca    8.3<L>      21 Oct 2020 06:19  Phos  2.4     10-21  Mg     2.3     10-21    TPro  6.7  /  Alb  3.0<L>  /  TBili  2.2<H>  /  DBili  1.2<H>  /  AST  80<H>  /  ALT  232<H>  /  AlkPhos  178<H>  10-21    LIVER FUNCTIONS - ( 21 Oct 2020 06:19 )  Alb: 3.0 g/dL / Pro: 6.7 g/dL / ALK PHOS: 178 U/L / ALT: 232 U/L DA / AST: 80 U/L / GGT: x               CARDIAC MARKERS ( 21 Oct 2020 06:19 )  <0.015 ng/mL / x     / x     / x     / x      CARDIAC MARKERS ( 20 Oct 2020 21:37 )  <0.015 ng/mL / x     / x     / x     / x          Stool Results:          RADIOLOGY RESULTS:  < from: US Hepatic & Pancreatic (10.20.20 @ 16:53) >    EXAM:  US LIVER AND PANCREAS                            PROCEDURE DATE:  10/20/2020          INTERPRETATION:  Right upper quadrant abdominal ultrasound    Indication: Right upper quadrant abdominal pain and transaminitis.    Right upper quadrant abdominal ultrasound is performed without a previous examination for comparison. The liver spans 15.4 cm which is within normal limits in size. There is a 2.6 cm right hepatic cyst. Duplex Doppler interrogation of the main portal vein demonstrates normal hepatopedal flow.    The common bile duct measures 6.7 mm in caliber which is within normal limits for age.    Multiple mobile gallstones in the gallbladder. No evidence for thickened gallbladder wall, pericholecystic fluid or ultrasonic Gomes's sign.    The visualized pancreas appears grossly unremarkable.    The right kidney measures 10.4 cm in length which is within normal limits in size. The right kidney appears grossly unremarkable without hydronephrosis.    The visualized IVC and aorta appear grossly unremarkable. No ascites is detected.    Impression: Cholelithiasis without evidence for acute cholecystitis.    Right hepatic cyst.            BRAN TRIPLETT M.D., ATTENDING RADIOLOGIST  This document has been electronically signed. Oct 20 2020  5:07PM    < end of copied text >    SURGICAL PATHOLOGY:      Patient is a 65y old  Female who presents with a chief complaint of abdominal pain (20 Oct 2020 22:06)     .     HPI:  Patient, 65 year old female from home with no significant PMH presents to the ED with RUQ and right flank pain for 2 days. According to patient, the pain started 2 days ago and initially was bearable but increased in intensity now which is why she came to the hospital. She denies any association with eating and says she was walking when it happened. She took aleve yesterday with some relief but nothing today. Denies fevers, nausea, vomiting. Patient endorses chills yesterday and constipation occasionally. She does not follow with any doctors. Patient also complains of having dark urine lately but no dysuria.             Allergies    ampicillin (Unknown)    Intolerances      MEDICATIONS  (STANDING):  ciprofloxacin   IVPB 400 milliGRAM(s) IV Intermittent every 12 hours  dextrose 5% + sodium chloride 0.9%. 1000 milliLiter(s) (100 mL/Hr) IV Continuous <Continuous>  enoxaparin Injectable 40 milliGRAM(s) SubCutaneous daily  metroNIDAZOLE    Tablet 500 milliGRAM(s) Oral every 8 hours    MEDICATIONS  (PRN):  acetaminophen   Tablet .. 650 milliGRAM(s) Oral every 6 hours PRN Mild Pain (1 - 3)  ketorolac   Injectable 30 milliGRAM(s) IV Push every 6 hours PRN Severe Pain (7 - 10)  oxycodone    5 mG/acetaminophen 325 mG 1 Tablet(s) Oral every 6 hours PRN Moderate Pain (4 - 6)      PAST MEDICAL & SURGICAL HISTORY:  No pertinent past medical history    No significant past surgical history      FAMILY HISTORY:  No pertinent family history in first degree relatives      Social History: No hsitory of : Tobacco use, IVDA, EToH  ______________________________________________________________________________________    PHYSICAL EXAM    Daily     Daily Weight in k.8 (20 Oct 2020 21:19)  BMI:   Change in Weight:  Vital Signs Last 24 Hrs  T(C): 36.6 (21 Oct 2020 04:56), Max: 36.8 (20 Oct 2020 21:19)  T(F): 97.8 (21 Oct 2020 04:56), Max: 98.3 (20 Oct 2020 21:19)  HR: 68 (21 Oct 2020 04:56) (68 - 98)  BP: 106/58 (21 Oct 2020 04:56) (106/58 - 136/82)  BP(mean): --  RR: 17 (21 Oct 2020 04:56) (16 - 18)  SpO2: 98% (21 Oct 2020 04:56) (98% - 98%)    General:  Well developed, well nourished, alert and active, no pallor, NAD.  HEENT:    Normal appearance of conjunctiva, ears, nose, lips, oropharynx, and oral mucosa, anicteric.  Neck:  No masses, no asymmetry.  Lymph Nodes:  No lymphadenopathy.   Cardiovascular:  RRR normal S1/S2, no murmur.  Respiratory:  CTA B/L, normal respiratory effort.   Abdominal:   soft, +right upper quadrant tenderness, normoactive BS  Extremities:   No clubbing or cyanosis, normal capillary refill, no edema.   Skin:   No rash, jaundice, lesions, eczema.   Musculoskeletal:  No joint swelling, erythema or tenderness.   Neuro: No focal deficits.   Other:   _______________________________________________________________________________________________  Lab Results:                          10.5   x     )-----------( 143      ( 21 Oct 2020 06:19 )             31.3     10-    142  |  112<H>  |  5<L>  ----------------------------<  110<H>  3.7   |  24  |  0.48<L>    Ca    8.3<L>      21 Oct 2020 06:19  Phos  2.4     10-  Mg     2.3     10-    TPro  6.7  /  Alb  3.0<L>  /  TBili  2.2<H>  /  DBili  1.2<H>  /  AST  80<H>  /  ALT  232<H>  /  AlkPhos  178<H>  10-21    LIVER FUNCTIONS - ( 21 Oct 2020 06:19 )  Alb: 3.0 g/dL / Pro: 6.7 g/dL / ALK PHOS: 178 U/L / ALT: 232 U/L DA / AST: 80 U/L / GGT: x               CARDIAC MARKERS ( 21 Oct 2020 06:19 )  <0.015 ng/mL / x     / x     / x     / x      CARDIAC MARKERS ( 20 Oct 2020 21:37 )  <0.015 ng/mL / x     / x     / x     / x          Stool Results:          RADIOLOGY RESULTS:  < from: US Hepatic & Pancreatic (10.20.20 @ 16:53) >    EXAM:  US LIVER AND PANCREAS                            PROCEDURE DATE:  10/20/2020          INTERPRETATION:  Right upper quadrant abdominal ultrasound    Indication: Right upper quadrant abdominal pain and transaminitis.    Right upper quadrant abdominal ultrasound is performed without a previous examination for comparison. The liver spans 15.4 cm which is within normal limits in size. There is a 2.6 cm right hepatic cyst. Duplex Doppler interrogation of the main portal vein demonstrates normal hepatopedal flow.    The common bile duct measures 6.7 mm in caliber which is within normal limits for age.    Multiple mobile gallstones in the gallbladder. No evidence for thickened gallbladder wall, pericholecystic fluid or ultrasonic Gomes's sign.    The visualized pancreas appears grossly unremarkable.    The right kidney measures 10.4 cm in length which is within normal limits in size. The right kidney appears grossly unremarkable without hydronephrosis.    The visualized IVC and aorta appear grossly unremarkable. No ascites is detected.    Impression: Cholelithiasis without evidence for acute cholecystitis.    Right hepatic cyst.            BRAN TRIPLETT M.D., ATTENDING RADIOLOGIST  This document has been electronically signed. Oct 20 2020  5:07PM    < end of copied text >

## 2020-10-21 NOTE — PROGRESS NOTE ADULT - NUTRITIONAL ASSESSMENT
Tot prot 6.7  Albumin 3.0  Mild protein calorie malnutrition    Pt currently NPO .  Encourage po when okay to resume diet.

## 2020-10-21 NOTE — PROGRESS NOTE ADULT - ASSESSMENT
went to see her pt is in HIDA scan  seen by GI and surgery  needs mrcp, hida, ekg, echo  d/w surgeon

## 2020-10-21 NOTE — PROGRESS NOTE ADULT - PROBLEM SELECTOR PLAN 3
no h/o hypothyroidism  TSH elevated  f/u free T3 and free T4 likely due to cholelithiasis   Hepatitis C not detected  uptrending  Monitor LFT's  Avoid hepatotoxic agents

## 2020-10-21 NOTE — PROGRESS NOTE ADULT - SUBJECTIVE AND OBJECTIVE BOX
NP Note discussed with  Primary Attending    Patient is a 65y old  Female who presents with a chief complaint of abdominal pain (21 Oct 2020 08:02)      INTERVAL HPI/OVERNIGHT EVENTS: no new complaints    MEDICATIONS  (STANDING):  ciprofloxacin   IVPB 400 milliGRAM(s) IV Intermittent every 12 hours  dextrose 5% + sodium chloride 0.9%. 1000 milliLiter(s) (100 mL/Hr) IV Continuous <Continuous>  enoxaparin Injectable 40 milliGRAM(s) SubCutaneous daily  metroNIDAZOLE    Tablet 500 milliGRAM(s) Oral every 8 hours    MEDICATIONS  (PRN):  acetaminophen   Tablet .. 650 milliGRAM(s) Oral every 6 hours PRN Mild Pain (1 - 3)  ketorolac   Injectable 30 milliGRAM(s) IV Push every 6 hours PRN Severe Pain (7 - 10)  oxycodone    5 mG/acetaminophen 325 mG 1 Tablet(s) Oral every 6 hours PRN Moderate Pain (4 - 6)      __________________________________________________  REVIEW OF SYSTEMS:    CONSTITUTIONAL: No fever,   EYES: no acute visual disturbances  NECK: No pain or stiffness  RESPIRATORY: No cough; No shortness of breath  CARDIOVASCULAR: No chest pain, no palpitations  GASTROINTESTINAL:less pain. No nausea or vomiting; No diarrhea   NEUROLOGICAL: No headache or numbness, no tremors  MUSCULOSKELETAL: No joint pain, no muscle pain  GENITOURINARY: no dysuria, no frequency, no hesitancy  PSYCHIATRY: no depression , no anxiety  ALL OTHER  ROS negative        Vital Signs Last 24 Hrs  T(C): 36.6 (21 Oct 2020 04:56), Max: 36.8 (20 Oct 2020 21:19)  T(F): 97.8 (21 Oct 2020 04:56), Max: 98.3 (20 Oct 2020 21:19)  HR: 68 (21 Oct 2020 04:56) (68 - 98)  BP: 106/58 (21 Oct 2020 04:56) (106/58 - 136/82)  BP(mean): --  RR: 17 (21 Oct 2020 04:56) (16 - 18)  SpO2: 98% (21 Oct 2020 04:56) (98% - 98%)    ________________________________________________  PHYSICAL EXAM:  GENERAL: NAD  HEENT: Normocephalic;  conjunctivae and sclerae clear; moist mucous membranes;   NECK : supple  CHEST/LUNG: Clear to auscultation bilaterally with good air entry   HEART: S1 S2  regular; no murmurs, gallops or rubs  ABDOMEN: Soft, Nontender, Nondistended; Bowel sounds present  EXTREMITIES: no cyanosis; no edema; no calf tenderness  SKIN: warm and dry; no rash  NERVOUS SYSTEM:  Awake and alert; Oriented  to place, person and time ; no new deficits    _________________________________________________  LABS:                        10.5   5.13  )-----------( 143      ( 21 Oct 2020 06:19 )             31.3     10-21    142  |  112<H>  |  5<L>  ----------------------------<  110<H>  3.7   |  24  |  0.48<L>    Ca    8.3<L>      21 Oct 2020 06:19  Phos  2.4     10-21  Mg     2.3     10-21    TPro  6.7  /  Alb  3.0<L>  /  TBili  2.2<H>  /  DBili  1.2<H>  /  AST  80<H>  /  ALT  232<H>  /  AlkPhos  178<H>  10-21      Urinalysis Basic - ( 20 Oct 2020 15:13 )    Color: Yellow / Appearance: Clear / S.020 / pH: x  Gluc: x / Ketone: Moderate  / Bili: Moderate / Urobili: 4   Blood: x / Protein: 100 / Nitrite: Negative   Leuk Esterase: Small / RBC: 2-5 /HPF / WBC 3-5 /HPF   Sq Epi: x / Non Sq Epi: Occasional /HPF / Bacteria: x      CAPILLARY BLOOD GLUCOSE            RADIOLOGY & ADDITIONAL TESTS:    Imaging Personally Reviewed:  YES/NO    Consultant(s) Notes Reviewed:   YES/ No    Care Discussed with Consultants :     Plan of care was discussed with patient and /or primary care giver; all questions and concerns were addressed and care was aligned with patient's wishes.     NP Note discussed with  Primary Attending    Patient is a 65y old  Female who presents with a chief complaint of abdominal pain (21 Oct 2020 08:02)    HPI   65 year old female from home with no significant PMH who presented to the ED with worsening RUQ and right flank pain for 2 days, despite Aleve, not associated with  eating and says she was walking when it happened.  Denies fevers, nausea, vomiting. Patient endorses chills and constipation occasionally. She does not follow with any doctors. Patient also complains of having dark urine lately but no dysuria. UA (+) for UTI .     USG RUQ showed multiple mobile gallstones in the gallbladder. No evidence for thickened gallbladder wall, pericholecystic fluid or ultrasonic Gomes's sign.    Patient is being admitted to medicine for RUQ pain and choledocholithiasis. Pt kept NPO, on IV hydration. Cipro and Flagyl IV Day 1-2. HIDA scan negative for acute cholelithiasis. MRCP pending.  Surgery and GI on board.     INTERVAL HPI/OVERNIGHT EVENTS: Pt seen and examined this morning. Pt is Ukrainian speaking .  # [phone] assisting. Pt reports abdominal discomfort is less today.     MEDICATIONS  (STANDING):  ciprofloxacin   IVPB 400 milliGRAM(s) IV Intermittent every 12 hours  dextrose 5% + sodium chloride 0.9%. 1000 milliLiter(s) (100 mL/Hr) IV Continuous <Continuous>  enoxaparin Injectable 40 milliGRAM(s) SubCutaneous daily  metroNIDAZOLE    Tablet 500 milliGRAM(s) Oral every 8 hours    MEDICATIONS  (PRN):  acetaminophen   Tablet .. 650 milliGRAM(s) Oral every 6 hours PRN Mild Pain (1 - 3)  ketorolac   Injectable 30 milliGRAM(s) IV Push every 6 hours PRN Severe Pain (7 - 10)  oxycodone    5 mG/acetaminophen 325 mG 1 Tablet(s) Oral every 6 hours PRN Moderate Pain (4 - 6)      __________________________________________________  REVIEW OF SYSTEMS:    CONSTITUTIONAL: No fever,   EYES: no acute visual disturbances  NECK: No pain or stiffness  RESPIRATORY: No cough; No shortness of breath  CARDIOVASCULAR: No chest pain, no palpitations  GASTROINTESTINAL:less pain. No nausea or vomiting; No diarrhea   NEUROLOGICAL: No headache or numbness, no tremors  MUSCULOSKELETAL: No joint pain, no muscle pain  GENITOURINARY: no dysuria, no frequency, no hesitancy  PSYCHIATRY: no depression , no anxiety  ALL OTHER  ROS negative        Vital Signs Last 24 Hrs  T(C): 36.6 (21 Oct 2020 04:56), Max: 36.8 (20 Oct 2020 21:19)  T(F): 97.8 (21 Oct 2020 04:56), Max: 98.3 (20 Oct 2020 21:19)  HR: 68 (21 Oct 2020 04:56) (68 - 98)  BP: 106/58 (21 Oct 2020 04:56) (106/58 - 136/82)  BP(mean): --  RR: 17 (21 Oct 2020 04:56) (16 - 18)  SpO2: 98% (21 Oct 2020 04:56) (98% - 98%)    ________________________________________________  PHYSICAL EXAM:  GENERAL: NAD  HEENT: Normocephalic;  conjunctivae and sclerae clear; moist mucous membranes;   NECK : supple  CHEST/LUNG: Clear to auscultation bilaterally with good air entry   HEART: S1 S2  regular; no murmurs, gallops or rubs  ABDOMEN: Soft, Nontender, Nondistended; Bowel sounds present  EXTREMITIES: no cyanosis; no edema; no calf tenderness  SKIN: warm and dry; no rash  NERVOUS SYSTEM:  Awake and alert; Oriented  to place, person and time ; no new deficits    _________________________________________________  LABS:                        10.5   5.13  )-----------( 143      ( 21 Oct 2020 06:19 )             31.3     10-    142  |  112<H>  |  5<L>  ----------------------------<  110<H>  3.7   |  24  |  0.48<L>    Ca    8.3<L>      21 Oct 2020 06:19  Phos  2.4     10-21  Mg     2.3     10-21    TPro  6.7  /  Alb  3.0<L>  /  TBili  2.2<H>  /  DBili  1.2<H>  /  AST  80<H>  /  ALT  232<H>  /  AlkPhos  178<H>  10-21      Urinalysis Basic - ( 20 Oct 2020 15:13 )    Color: Yellow / Appearance: Clear / S.020 / pH: x  Gluc: x / Ketone: Moderate  / Bili: Moderate / Urobili: 4   Blood: x / Protein: 100 / Nitrite: Negative   Leuk Esterase: Small / RBC: 2-5 /HPF / WBC 3-5 /HPF   Sq Epi: x / Non Sq Epi: Occasional /HPF / Bacteria: x      CAPILLARY BLOOD GLUCOSE            RADIOLOGY & ADDITIONAL TESTS:      < from: US Hepatic & Pancreatic (10.20.20 @ 16:53) >  Impression: Cholelithiasis without evidence for acute cholecystitis.    Right hepatic cyst.    < end of copied text >    < from: NM Hepatobiliary Imaging w/ RX (10.21.20 @ 13:50) >  IMPRESSION: Normal morphine-augmented hepatobiliary scan. No radionuclide evidence of acute cholecystitis.    < end of copied text >      Consultant(s) Notes Reviewed:   YES/ No    Care Discussed with Consultants :     Plan of care was discussed with patient and /or primary care giver; all questions and concerns were addressed and care was aligned with patient's wishes.

## 2020-10-21 NOTE — PROGRESS NOTE ADULT - PROBLEM SELECTOR PLAN 2
likely due to cholelithiasis   Hepatitis C not detected  uptrending  Monitor LFT's  Avoid hepatotoxic agents UA (+) for UTI  afebrile, no leukocytosis  c/w IV Cipro and Flagyl  f/u UC

## 2020-10-21 NOTE — PROGRESS NOTE ADULT - PROBLEM SELECTOR PLAN 1
Hepatic and pancreatic US results as above  HIDA scan shows no acute cholecystitis  Keep NPO   c/w IV hydration  c/w IV Cipro and Flagyl, Day 1-2  Pain control  F/u MRCP   Afterwards can have only clear liquid diet   if needed pt might go for ERCP if stone found on MRCP.  GI Dr. Antoine following  Surgery following

## 2020-10-21 NOTE — CONSULT NOTE ADULT - ASSESSMENT
Assessment and Plan:       Patient, 65 year old female from home with no significant PMH presents to the ED with RUQ and right flank pain for 2 days. USG RUQ showed multiple mobile gallstones in the gallbladder. No evidence for thickened gallbladder wall, pericholecystic fluid or ultrasonic Gomes's sign.         Cholelithiasis without obstruction.       Patient presented with RUQ and flank pain  p/w afebrile, no leucocytosis, Total bili 4.3, , ALT//136, LFTs show obstructive pattern  USG abdomen shows cholelithiasis without evidence for acute cholecystitis.  as cbd is 6.7 mm it is dialted for her age, pt might have choledocolithiasis  Pt will get MRCP today and afterwards can have only clear liquid diet   if needed pt might go for ERCP if stone found on MRCP.  continue abx and iv fluids as per primary team     Procedure explained to pt using interpretor service by attending.    ANEMIA :  -send hemolysis panel.          discussed with Dr. Antoine Assessment and Plan:       Patient, 65 year old female from home with no significant PMH presents to the ED with RUQ and right flank pain for 2 days. USG RUQ showed multiple mobile gallstones in the gallbladder. No evidence for thickened gallbladder wall, pericholecystic fluid or ultrasonic Gomes's sign.         Cholelithiasis without obstruction.       Patient presented with RUQ and flank pain  p/w afebrile, no leucocytosis, Total bili 4.3, , ALT//136, LFTs show obstructive pattern  USG abdomen shows cholelithiasis without evidence for acute cholecystitis.  as cbd is 6.7 mm it is dialted for her age, pt might have choledocolithiasis  Pt will get MRCP today and afterwards can have only clear liquid diet   if needed pt might go for ERCP if stone found on MRCP.  continue abx and iv fluids as per primary team     Procedure explained to pt using interpretor service by attending.    ANEMIA :  -send hemolysis panel.          discussed with Dr. Antoine      < from: MR Abdomen w/ IV Cont (10.21.20 @ 17:49) >    EXAM:  MR ABDOMEN IC                            PROCEDURE DATE:  10/21/2020          INTERPRETATION:  MR ABDOMEN WITH IV CONTRAST    CLINICAL INFORMATION: Right upper quadrant pain    eval for stone    TECHNIQUE: Multiplanar T1-weighted, T2-weighted, and diffusion weighted sequences were obtained of the abdomen, including dynamic post-contrast T1-weighted sequences.  3D heavily T2-weighted thin-section MRCP was also performed.    Field Strength: 1.5T    Contrast: 6.5 cc Gadavist.    COMPARISON:Same-day HIDA scan and abdominal ultrasound 1 day prior.    FINDINGS:    LOWER CHEST: Clear.    LIVER: Simple cyst in the right lobe.  BILE DUCTS: 10 mm common bile that with normal distal tapering. No filling defect. No intrahepatic dilatation.  GALLBLADDER: Small stones without wall thickening or inflammation.  SPLEEN: Normal.  PANCREAS: Normal.  ADRENALS: Normal.  KIDNEYS/URETERS: Symmetric nephrograms. No hydronephrosis.    VISUALIZED PORTIONS:    BOWEL: No bowel-related abnormality.  PERITONEUM: No ascites.  VESSELS:  Normal caliber aorta.  RETROPERITONEUM/LYMPH NODES: No adenopathy.  ABDOMINAL WALL: Normal.  BONES: No aggressive lesion.    IMPRESSION:    10 mm common bile duct without choledocholithiasis or obstructive pathology identified. Correlation with LFTs is needed to determine the clinical significance.    Small gallstones without secondary signs of acute cholecystitis.            LIDIA GAING M.D., ATTENDING RADIOLOGIST  This document has been electronically signed. Oct 21 2020  7:00PM    < end of copied text >

## 2020-10-21 NOTE — PROGRESS NOTE ADULT - SUBJECTIVE AND OBJECTIVE BOX
pt with upper abdominal pain and elevated lfts  [facility]    CBC Full  -  ( 21 Oct 2020 06:19 )  WBC Count : 5.13 K/uL  RBC Count : 3.43 M/uL  Hemoglobin : 10.5 g/dL  Hematocrit : 31.3 %  Platelet Count - Automated : 143 K/uL  Mean Cell Volume : 91.3 fl  Mean Cell Hemoglobin : 30.6 pg  Mean Cell Hemoglobin Concentration : 33.5 gm/dL  Auto Neutrophil # : 3.49 K/uL  Auto Lymphocyte # : 1.33 K/uL  Auto Monocyte # : 0.15 K/uL  Auto Eosinophil # : 0.15 K/uL  Auto Basophil # : 0.00 K/uL  Auto Neutrophil % : 68.0 %  Auto Lymphocyte % : 26.0 %  Auto Monocyte % : 3.0 %  Auto Eosinophil % : 3.0 %  Auto Basophil % : 0.0 %    10-21    142  |  112<H>  |  5<L>  ----------------------------<  110<H>  3.7   |  24  |  0.48<L>    Ca    8.3<L>      21 Oct 2020 06:19  Phos  2.4     10-21  Mg     2.3     10-21    TPro  6.7  /  Alb  3.0<L>  /  TBili  2.2<H>  /  DBili  1.2<H>  /  AST  80<H>  /  ALT  232<H>  /  AlkPhos  178<H>  10-21    Patient is a 65y old  Female who presents with a chief complaint of abdominal pain (21 Oct 2020 13:30)    I&O's Detail    Vital Signs Last 24 Hrs  T(C): 36.9 (21 Oct 2020 16:33), Max: 37.4 (21 Oct 2020 14:55)  T(F): 98.4 (21 Oct 2020 16:33), Max: 99.3 (21 Oct 2020 14:55)  HR: 78 (21 Oct 2020 16:33) (68 - 98)  BP: 111/59 (21 Oct 2020 16:33) (106/58 - 136/82)  BP(mean): --  RR: 17 (21 Oct 2020 16:33) (16 - 18)  SpO2: 97% (21 Oct 2020 16:33) (97% - 99%)  LIVER FUNCTIONS - ( 21 Oct 2020 06:19 )  Alb: 3.0 g/dL / Pro: 6.7 g/dL / ALK PHOS: 178 U/L / ALT: 232 U/L DA / AST: 80 U/L / GGT: x           MEDICATIONS  (STANDING):  ciprofloxacin   IVPB 400 milliGRAM(s) IV Intermittent every 12 hours  dextrose 5% + sodium chloride 0.9%. 1000 milliLiter(s) (100 mL/Hr) IV Continuous <Continuous>  dextrose 5% + sodium chloride 0.9%. 1000 milliLiter(s) (100 mL/Hr) IV Continuous <Continuous>  enoxaparin Injectable 40 milliGRAM(s) SubCutaneous daily  metroNIDAZOLE    Tablet 500 milliGRAM(s) Oral every 8 hours    MEDICATIONS  (PRN):  acetaminophen   Tablet .. 650 milliGRAM(s) Oral every 6 hours PRN Mild Pain (1 - 3)  ketorolac   Injectable 30 milliGRAM(s) IV Push every 6 hours PRN Severe Pain (7 - 10)  oxycodone    5 mG/acetaminophen 325 mG 1 Tablet(s) Oral every 6 hours PRN Moderate Pain (4 - 6)    abdomen mildly tender upper abdomen

## 2020-10-22 ENCOUNTER — RESULT REVIEW (OUTPATIENT)
Age: 65
End: 2020-10-22

## 2020-10-22 DIAGNOSIS — E87.6 HYPOKALEMIA: ICD-10-CM

## 2020-10-22 DIAGNOSIS — Z02.9 ENCOUNTER FOR ADMINISTRATIVE EXAMINATIONS, UNSPECIFIED: ICD-10-CM

## 2020-10-22 DIAGNOSIS — Z29.9 ENCOUNTER FOR PROPHYLACTIC MEASURES, UNSPECIFIED: ICD-10-CM

## 2020-10-22 LAB
ALBUMIN SERPL ELPH-MCNC: 3 G/DL — LOW (ref 3.5–5)
ALP SERPL-CCNC: 197 U/L — HIGH (ref 40–120)
ALT FLD-CCNC: 172 U/L DA — HIGH (ref 10–60)
ANION GAP SERPL CALC-SCNC: 8 MMOL/L — SIGNIFICANT CHANGE UP (ref 5–17)
AST SERPL-CCNC: 51 U/L — HIGH (ref 10–40)
BILIRUB SERPL-MCNC: 1.5 MG/DL — HIGH (ref 0.2–1.2)
BUN SERPL-MCNC: 5 MG/DL — LOW (ref 7–18)
CALCIUM SERPL-MCNC: 8.3 MG/DL — LOW (ref 8.4–10.5)
CHLORIDE SERPL-SCNC: 109 MMOL/L — HIGH (ref 96–108)
CHOLEST SERPL-MCNC: 249 MG/DL — HIGH
CO2 SERPL-SCNC: 23 MMOL/L — SIGNIFICANT CHANGE UP (ref 22–31)
CREAT SERPL-MCNC: 0.49 MG/DL — LOW (ref 0.5–1.3)
GLUCOSE SERPL-MCNC: 114 MG/DL — HIGH (ref 70–99)
HDLC SERPL-MCNC: 66 MG/DL — SIGNIFICANT CHANGE UP
INR BLD: 1.06 RATIO — SIGNIFICANT CHANGE UP (ref 0.88–1.16)
LIPID PNL WITH DIRECT LDL SERPL: 164 MG/DL — HIGH
MAGNESIUM SERPL-MCNC: 2.3 MG/DL — SIGNIFICANT CHANGE UP (ref 1.6–2.6)
NON HDL CHOLESTEROL: 183 MG/DL — HIGH
PHOSPHATE SERPL-MCNC: 2.6 MG/DL — SIGNIFICANT CHANGE UP (ref 2.5–4.5)
POTASSIUM SERPL-MCNC: 3.2 MMOL/L — LOW (ref 3.5–5.3)
POTASSIUM SERPL-SCNC: 3.2 MMOL/L — LOW (ref 3.5–5.3)
PROT SERPL-MCNC: 6.8 G/DL — SIGNIFICANT CHANGE UP (ref 6–8.3)
PROTHROM AB SERPL-ACNC: 12.6 SEC — SIGNIFICANT CHANGE UP (ref 10.6–13.6)
SODIUM SERPL-SCNC: 140 MMOL/L — SIGNIFICANT CHANGE UP (ref 135–145)
T3FREE SERPL-MCNC: 3.34 PG/ML — SIGNIFICANT CHANGE UP (ref 1.8–4.6)
T4 FREE SERPL-MCNC: 1.3 NG/DL — SIGNIFICANT CHANGE UP (ref 0.9–1.8)
TRIGL SERPL-MCNC: 95 MG/DL — SIGNIFICANT CHANGE UP

## 2020-10-22 PROCEDURE — 47562 LAPAROSCOPIC CHOLECYSTECTOMY: CPT | Mod: AS

## 2020-10-22 PROCEDURE — 71046 X-RAY EXAM CHEST 2 VIEWS: CPT | Mod: 26

## 2020-10-22 PROCEDURE — 47562 LAPAROSCOPIC CHOLECYSTECTOMY: CPT

## 2020-10-22 PROCEDURE — 93306 TTE W/DOPPLER COMPLETE: CPT | Mod: 26

## 2020-10-22 PROCEDURE — 88304 TISSUE EXAM BY PATHOLOGIST: CPT | Mod: 26

## 2020-10-22 RX ORDER — HYDROMORPHONE HYDROCHLORIDE 2 MG/ML
1 INJECTION INTRAMUSCULAR; INTRAVENOUS; SUBCUTANEOUS
Refills: 0 | Status: DISCONTINUED | OUTPATIENT
Start: 2020-10-22 | End: 2020-10-22

## 2020-10-22 RX ORDER — POTASSIUM CHLORIDE 20 MEQ
10 PACKET (EA) ORAL
Refills: 0 | Status: COMPLETED | OUTPATIENT
Start: 2020-10-22 | End: 2020-10-22

## 2020-10-22 RX ORDER — ONDANSETRON 8 MG/1
4 TABLET, FILM COATED ORAL EVERY 6 HOURS
Refills: 0 | Status: DISCONTINUED | OUTPATIENT
Start: 2020-10-22 | End: 2020-10-24

## 2020-10-22 RX ORDER — ONDANSETRON 8 MG/1
4 TABLET, FILM COATED ORAL ONCE
Refills: 0 | Status: DISCONTINUED | OUTPATIENT
Start: 2020-10-22 | End: 2020-10-22

## 2020-10-22 RX ORDER — ACETAMINOPHEN 500 MG
1000 TABLET ORAL ONCE
Refills: 0 | Status: COMPLETED | OUTPATIENT
Start: 2020-10-22 | End: 2020-10-23

## 2020-10-22 RX ORDER — METRONIDAZOLE 500 MG
500 TABLET ORAL EVERY 8 HOURS
Refills: 0 | Status: DISCONTINUED | OUTPATIENT
Start: 2020-10-22 | End: 2020-10-24

## 2020-10-22 RX ORDER — OXYCODONE AND ACETAMINOPHEN 5; 325 MG/1; MG/1
1 TABLET ORAL EVERY 6 HOURS
Refills: 0 | Status: DISCONTINUED | OUTPATIENT
Start: 2020-10-22 | End: 2020-10-24

## 2020-10-22 RX ORDER — CIPROFLOXACIN LACTATE 400MG/40ML
400 VIAL (ML) INTRAVENOUS EVERY 12 HOURS
Refills: 0 | Status: DISCONTINUED | OUTPATIENT
Start: 2020-10-22 | End: 2020-10-24

## 2020-10-22 RX ORDER — SODIUM CHLORIDE 9 MG/ML
1000 INJECTION, SOLUTION INTRAVENOUS
Refills: 0 | Status: DISCONTINUED | OUTPATIENT
Start: 2020-10-22 | End: 2020-10-22

## 2020-10-22 RX ORDER — HYDROMORPHONE HYDROCHLORIDE 2 MG/ML
0.5 INJECTION INTRAMUSCULAR; INTRAVENOUS; SUBCUTANEOUS
Refills: 0 | Status: DISCONTINUED | OUTPATIENT
Start: 2020-10-22 | End: 2020-10-22

## 2020-10-22 RX ORDER — ONDANSETRON 8 MG/1
4 TABLET, FILM COATED ORAL EVERY 8 HOURS
Refills: 0 | Status: DISCONTINUED | OUTPATIENT
Start: 2020-10-22 | End: 2020-10-22

## 2020-10-22 RX ADMIN — Medication 100 MILLIEQUIVALENT(S): at 14:20

## 2020-10-22 RX ADMIN — Medication 200 MILLIGRAM(S): at 05:05

## 2020-10-22 RX ADMIN — Medication 500 MILLIGRAM(S): at 05:05

## 2020-10-22 RX ADMIN — Medication 100 MILLIEQUIVALENT(S): at 11:34

## 2020-10-22 RX ADMIN — Medication 500 MILLIGRAM(S): at 22:49

## 2020-10-22 RX ADMIN — Medication 200 MILLIGRAM(S): at 22:49

## 2020-10-22 RX ADMIN — Medication 100 MILLIEQUIVALENT(S): at 13:00

## 2020-10-22 RX ADMIN — HYDROMORPHONE HYDROCHLORIDE 1 MILLIGRAM(S): 2 INJECTION INTRAMUSCULAR; INTRAVENOUS; SUBCUTANEOUS at 19:43

## 2020-10-22 NOTE — PROGRESS NOTE ADULT - ASSESSMENT
seen and examined comfortable on bed  pain better , no nausea or vomiting   vsstable abd soft bs nml no tai sign  lft slightly better  mrcp 10 mm cbd but without stone  now GI doesnt want ERCP just directly for cholecystectomy  pt denies CP or sob or palp  cardiology clearance was done  supplement K  pt is cleared for low to mod risk

## 2020-10-22 NOTE — CONSULT NOTE ADULT - ASSESSMENT
, 65 year old female from home with no significant PMH presents to the ED with RUQ and right flank pain for 2 days.Patient was  being admitted to medicine for RUQ pain and choledocholithiasis.      Problem/Plan - 1:  ·  Problem: Cholelithiasis without obstruction.  Plan: Patient presented with RUQ and flank pain  On admission, afebrile, no leucocytosis  Total bili 4.3, , ALT//136  LFTs show obstructive pattern  USG abdomen shows cholelithiasis without evidence for acute cholecystitis.  Scheduled for Cholecystectomy  Patient's Revised Cardiac Risk Index (RCRI) score is 0 ( 3 % risk) . Patient is at low  risk of  adverse perioperative cardiac events undergoing  intermediaterisk surgery. No further cardiac testing is needed prior to patient's surgery.               Problem/Plan - 2:  ·  Problem: Need for prophylactic measure.  Plan: [ ] Previous VTE                                    3  [ ] Thrombophilia                                  2  [ ] Lower limb paralysis                        2  (unable to hold up >15 seconds)    [ ] Current Cancer (within 6 months)        2   [ ] Immobilization > 24 hrs                    1  [ ] ICU/CCU stay > 24 hrs                      1  [x ] Age > 60                                         1   lovenox for dvt prophylaxis.

## 2020-10-22 NOTE — PROGRESS NOTE ADULT - SUBJECTIVE AND OBJECTIVE BOX
65 year old female from home with no significant PMH who presented to the ED with worsening RUQ and right flank pain for 2 days, despite Aleve, not associated with  eating and says she was walking when it happened.  Denies fevers, nausea, vomiting. Patient endorses chills and constipation occasionally. She does not follow with any doctors. Patient also complains of having dark urine lately but no dysuria. UA (+) for UTI .USG RUQ showed multiple mobile gallstones in the gallbladder. No evidence for thickened gallbladder wall, pericholecystic fluid or ultrasonic tai's sign. Patient admitted to medicine for RUQ pain and choledocholithiasis, Cipro and Flagyl. GI following. HIDA scan negative for acute cholelithiasis. MRCP showed 10 mm common bile duct without choledocholithiasis or obstructive pathology. Small gallstones without secondary signs of acute cholecystitis. Surgery following. ERCP was cancelled, plan is for lap cholecystectomy in OR today.     INTERVAL HPI/OVERNIGHT EVENTS: Seen at bedside,  ID #[id number], complaining of abdominal pain.    MEDICATIONS  (STANDING):  ciprofloxacin   IVPB 400 milliGRAM(s) IV Intermittent every 12 hours  dextrose 5% + sodium chloride 0.9%. 1000 milliLiter(s) (100 mL/Hr) IV Continuous <Continuous>  dextrose 5% + sodium chloride 0.9%. 1000 milliLiter(s) (100 mL/Hr) IV Continuous <Continuous>  metroNIDAZOLE    Tablet 500 milliGRAM(s) Oral every 8 hours  potassium chloride  10 mEq/100 mL IVPB 10 milliEquivalent(s) IV Intermittent every 1 hour    MEDICATIONS  (PRN):  acetaminophen   Tablet .. 650 milliGRAM(s) Oral every 6 hours PRN Mild Pain (1 - 3)  ketorolac   Injectable 30 milliGRAM(s) IV Push every 6 hours PRN Severe Pain (7 - 10)  ondansetron Injectable 4 milliGRAM(s) IV Push every 8 hours PRN Nausea and/or Vomiting  oxycodone    5 mG/acetaminophen 325 mG 1 Tablet(s) Oral every 6 hours PRN Moderate Pain (4 - 6)    __________________________________________________  REVIEW OF SYSTEMS:    CONSTITUTIONAL: No fever,   EYES: no acute visual disturbances  NECK: No pain or stiffness  RESPIRATORY: No cough; No shortness of breath  CARDIOVASCULAR: No chest pain, no palpitations  GASTROINTESTINAL: Complains of abdominal pain  NEUROLOGICAL: No headache or numbness, no tremors  MUSCULOSKELETAL: No joint pain, no muscle pain  GENITOURINARY: no dysuria, no frequency, no hesitancy  PSYCHIATRY: no depression , no anxiety  ALL OTHER  ROS negative        Vital Signs Last 24 Hrs  T(C): 36.8 (22 Oct 2020 08:00), Max: 37.4 (21 Oct 2020 14:55)  T(F): 98.2 (22 Oct 2020 08:00), Max: 99.3 (21 Oct 2020 14:55)  HR: 69 (22 Oct 2020 08:00) (69 - 91)  BP: 113/55 (22 Oct 2020 08:00) (104/55 - 119/90)  BP(mean): --  RR: 16 (22 Oct 2020 08:00) (16 - 17)  SpO2: 95% (22 Oct 2020 08:00) (95% - 99%)    ________________________________________________  PHYSICAL EXAM:  GENERAL: NAD  HEENT: Normocephalic;  conjunctivae and sclerae clear; moist mucous membranes;   NECK : supple  CHEST/LUNG: Clear to auscultation bilaterally with good air entry   HEART: S1 S2  regular; no murmurs, gallops or rubs  ABDOMEN: Soft, pain with palpation   EXTREMITIES: no cyanosis; no edema; no calf tenderness  SKIN: warm and dry; no rash  NERVOUS SYSTEM:  Awake and alert; Oriented  to place, person and time ; no new deficits    _________________________________________________  LABS:                        10.5   5.13  )-----------( 143      ( 21 Oct 2020 06:19 )             31.3     10-22    140  |  109<H>  |  5<L>  ----------------------------<  114<H>  3.2<L>   |  23  |  0.49<L>    Ca    8.3<L>      22 Oct 2020 07:39  Phos  2.6     10-22  Mg     2.3     10-22    TPro  6.8  /  Alb  3.0<L>  /  TBili  1.5<H>  /  DBili  x   /  AST  51<H>  /  ALT  172<H>  /  AlkPhos  197<H>  10-22    PT/INR - ( 22 Oct 2020 07:39 )   PT: 12.6 sec;   INR: 1.06 ratio           Urinalysis Basic - ( 20 Oct 2020 15:13 )    Color: Yellow / Appearance: Clear / S.020 / pH: x  Gluc: x / Ketone: Moderate  / Bili: Moderate / Urobili: 4   Blood: x / Protein: 100 / Nitrite: Negative   Leuk Esterase: Small / RBC: 2-5 /HPF / WBC 3-5 /HPF   Sq Epi: x / Non Sq Epi: Occasional /HPF / Bacteria: x      CAPILLARY BLOOD GLUCOSE    RADIOLOGY & ADDITIONAL TESTS:    < from: US Hepatic & Pancreatic (10.20.20 @ 16:53) >    Impression: Cholelithiasis without evidence for acute cholecystitis.    Right hepatic cyst.      c< from: MR Abdomen w/ IV Cont (10.21.20 @ 17:49) >  IMPRESSION:    10 mm common bile duct without choledocholithiasis or obstructive pathology identified. Correlation with LFTs is needed to determine the clinical significance.    Small gallstones without secondary signs of acute cholecystitis.          Imaging  Reviewed:  YES    Consultant(s) Notes Reviewed:   YES      Plan of care was discussed with patient and /or primary care giver; all questions and concerns were addressed

## 2020-10-22 NOTE — PROGRESS NOTE ADULT - PROBLEM SELECTOR PLAN 3
likely due to cholelithiasis   Hepatitis C not detected  Downtrending  Monitor LFTs  Avoid hepatotoxic agents

## 2020-10-22 NOTE — PROGRESS NOTE ADULT - PROBLEM SELECTOR PLAN 1
P/w with RUQ and right flank pain  Hepatic and pancreatic US results as above  HIDA scan shows no acute cholecystitis  MR abd-see above  Plan for P/w with RUQ and right flank pain  Hepatic and pancreatic US results as above  HIDA scan shows no acute cholecystitis  MR abd-see above  ERCP cancelled  For lap katherine in OR today  GI Dr. Antoine  Surgery following P/w with RUQ and right flank pain  Hepatic and pancreatic US results as above  HIDA scan shows no acute cholecystitis  MR abd-see above  ERCP cancelled  For lap katherine in OR today  Medical clearance obtained   GI Dr. Antoine  Surgery following P/w with RUQ and right flank pain  LFTs show obstructive pattern  Hepatic and pancreatic US results as above  HIDA scan shows no acute cholecystitis  MR abd-see above  ERCP cancelled  For lap katherine in OR today  Medical clearance obtained   GI Dr. Antoine  Surgery following

## 2020-10-22 NOTE — CONSULT NOTE ADULT - SUBJECTIVE AND OBJECTIVE BOX
DATE OF SERVICE: 10/22/2020   Patient was seen,examined and evaluated  by me.    CHIEF COMPLAINT:Abdominal pain    HPI:Patient, 65 year old female from home with no significant PMH presents to the ED with RUQ and right flank pain for 2 days. According to patient, the pain started 2 days ago and initially was bearable but increased in intensity now which is why she came to the hospital. She denies any association with eating and says she was walking when it happened. She took aleve yesterday with some relief but nothing today. Denies fevers, nausea, vomiting. Patient endorses chills yesterday and constipation occasionally. She does not follow with any doctors. Patient also complains of having dark urine lately but no dysuria.     In ED, patient is in NAD.   Vitals:  /72  HR 96  Afebrile  RR 18 (20 Oct 2020 19:34)      PAST MEDICAL & SURGICAL HISTORY:  No pertinent past medical history  No significant past surgical history        MEDICATIONS  (STANDING):  ciprofloxacin   IVPB 400 milliGRAM(s) IV Intermittent every 12 hours  dextrose 5% + sodium chloride 0.9%. 1000 milliLiter(s) (100 mL/Hr) IV Continuous <Continuous>  dextrose 5% + sodium chloride 0.9%. 1000 milliLiter(s) (100 mL/Hr) IV Continuous <Continuous>  enoxaparin Injectable 40 milliGRAM(s) SubCutaneous daily  metroNIDAZOLE    Tablet 500 milliGRAM(s) Oral every 8 hours  potassium chloride  10 mEq/100 mL IVPB 10 milliEquivalent(s) IV Intermittent every 1 hour    MEDICATIONS  (PRN):  acetaminophen   Tablet .. 650 milliGRAM(s) Oral every 6 hours PRN Mild Pain (1 - 3)  ketorolac   Injectable 30 milliGRAM(s) IV Push every 6 hours PRN Severe Pain (7 - 10)  ondansetron Injectable 4 milliGRAM(s) IV Push every 8 hours PRN Nausea and/or Vomiting  oxycodone    5 mG/acetaminophen 325 mG 1 Tablet(s) Oral every 6 hours PRN Moderate Pain (4 - 6)      FAMILY HISTORY:  No pertinent family history in first degree relatives      No family history of premature coronary artery disease or sudden cardiac death    SOCIAL HISTORY:  Smoking-[ ] Active  [ ] Former [x ] Non Smoker  Alcohol-[x ] Denies [ ] Social [ ] Daily  Ilicit Drug use-[x ] Denies [ ] Active user    REVIEW OF SYSTEMS:  Constitutional: [ ] fever, [ ]weight loss, [ ]fatigue   Activity [ ] Bedbound,[ ] Ambulates [ ] Unassisted[ ] Cane/Walker [ ] Assistence.  Effort tolerance:[ ] Excellent [ ] Good [ ] Fair [ ] Poor [ ]  Eyes: [ ] visual changes  Respiratory: [ ]shortness of breath;  [ ] cough, [ ]wheezing, [ ]chills, [ ]hemoptysis  Cardiovascular: [ ] chest pain, [ ]palpitations, [ ]dizziness,  [ ]leg swelling[ ]orthopnea [ ]PND  Gastrointestinal: [x ] abdominal pain, [x ]nausea, [ ]vomiting,  [ ]diarrhea,[ ]constipation  Genitourinary: [ ] dysuria, [ ] hematuria  Neurologic: [ ] headaches [ ] tremors[ ] weakness  Skin: [ ] itching, [ ]burning, [ ] rashes  Endocrine: [ ] heat or cold intolerance  Musculoskeletal: [ ] joint pain or swelling; [ ] muscle, back, or extremity pain  Psychiatric: [ ] depression, [ ]anxiety, [ ]mood swings, or [ ]difficulty sleeping  Hematologic: [ ] easy bruising, [ ] bleeding gums       [ x] All others negative	  [ ] Unable to obtain    Vital Signs Last 24 Hrs  T(C): 36.8 (22 Oct 2020 08:00), Max: 37.4 (21 Oct 2020 14:55)  T(F): 98.2 (22 Oct 2020 08:00), Max: 99.3 (21 Oct 2020 14:55)  HR: 69 (22 Oct 2020 08:00) (69 - 91)  BP: 113/55 (22 Oct 2020 08:00) (104/55 - 119/90)  RR: 16 (22 Oct 2020 08:00) (16 - 17)  SpO2: 95% (22 Oct 2020 08:00) (95% - 99%)  I&O's Summary      PHYSICAL EXAM:  General: No acute distress BMI-24  HEENT: EOMI, PERRL[ ] Icteric  Neck: Supple, No JVD  Lungs: Equal air entry bilaterally; [ ] Rales [ ] Rhonchi [ ] Wheezing  Heart: Regular rate and rhythm;[x ] Murmurs-   2/6 [x ] Systolic [ ] Diastolic [ ] Radiation,No rubs, or gallops  Abdomen:Tender, bowel sounds present  Extremities: No clubbing, cyanosis, or edema[ ] Calf tenderness  Nervous system:  Alert & Oriented X3, no focal deficits  Psychiatric: Normal affect  Skin: No rashes or lesions      LABS:  10-22    140  |  109<H>  |  5<L>  ----------------------------<  114<H>  3.2<L>   |  23  |  0.49<L>    Ca    8.3<L>      22 Oct 2020 07:39  Phos  2.6     10-22  Mg     2.3     10-22    TPro  6.8  /  Alb  3.0<L>  /  TBili  1.5<H>  /  DBili  x   /  AST  51<H>  /  ALT  172<H>  /  AlkPhos  197<H>  10-22    Creatinine Trend: 0.49<--, 0.48<--, 0.53<--                        10.5   5.13  )-----------( 143      ( 21 Oct 2020 06:19 )             31.3     PT/INR - ( 22 Oct 2020 07:39 )   PT: 12.6 sec;   INR: 1.06 ratio             Lipid Panel:   Cardiac Enzymes: CARDIAC MARKERS ( 21 Oct 2020 06:19 )  <0.015 ng/mL / x     / x     / x     / x      CARDIAC MARKERS ( 20 Oct 2020 21:37 )  <0.015 ng/mL / x     / x     / x     / x                RADIOLOGY:    ECG [my interpretation]:Sinus rhythm no acute ST T wave abnormalities

## 2020-10-22 NOTE — CHART NOTE - NSCHARTNOTEFT_GEN_A_CORE
Studies reviewed  Patient admitted with Cholelithiasis and acute cholecystitis with elevated lfts. Radiology studies reviewed. Tender RUQ abdomen. Laparoscopic cholecystectomy possible open was discussed. The potential for bleeding, CBD injury, bowel injury and other related complications were discussed. All the questions were answered.  Informed consent for laparoscopic cholecystectomy possible open surgery was obtained   used

## 2020-10-22 NOTE — PROGRESS NOTE ADULT - SUBJECTIVE AND OBJECTIVE BOX
HPI:  Patient, 65 year old female from home with no significant PMH presents to the ED with RUQ and right flank pain for 2 days. According to patient, the pain started 2 days ago and initially was bearable but increased in intensity now which is why she came to the hospital. She denies any association with eating and says she was walking when it happened. She took aleve yesterday with some relief but nothing today. Denies fevers, nausea, vomiting. Patient endorses chills yesterday and constipation occasionally. She does not follow with any doctors. Patient also complains of having dark urine lately but no dysuria.     In ED, patient is in NAD.   Vitals:  /72  HR 96  Afebrile  RR 18 (20 Oct 2020 19:34)      Patient is a 65y old  Female who presents with a chief complaint of abdominal pain (22 Oct 2020 10:02)      INTERVAL HPI/OVERNIGHT EVENTS:  T(C): 36.8 (10-22-20 @ 08:00), Max: 37.4 (10-21-20 @ 14:55)  HR: 69 (10-22-20 @ 08:00) (69 - 91)  BP: 113/55 (10-22-20 @ 08:00) (104/55 - 119/90)  RR: 16 (10-22-20 @ 08:00) (16 - 17)  SpO2: 95% (10-22-20 @ 08:00) (95% - 99%)  Wt(kg): --  I&O's Summary      REVIEW OF SYSTEMS: denies fever, chills, SOB, palpitations, chest pain, abdominal pain, nausea, vomitting, diarrhea, constipation, dizziness    MEDICATIONS  (STANDING):  ciprofloxacin   IVPB 400 milliGRAM(s) IV Intermittent every 12 hours  dextrose 5% + sodium chloride 0.9%. 1000 milliLiter(s) (100 mL/Hr) IV Continuous <Continuous>  dextrose 5% + sodium chloride 0.9%. 1000 milliLiter(s) (100 mL/Hr) IV Continuous <Continuous>  metroNIDAZOLE    Tablet 500 milliGRAM(s) Oral every 8 hours  potassium chloride  10 mEq/100 mL IVPB 10 milliEquivalent(s) IV Intermittent every 1 hour    MEDICATIONS  (PRN):  acetaminophen   Tablet .. 650 milliGRAM(s) Oral every 6 hours PRN Mild Pain (1 - 3)  ketorolac   Injectable 30 milliGRAM(s) IV Push every 6 hours PRN Severe Pain (7 - 10)  ondansetron Injectable 4 milliGRAM(s) IV Push every 8 hours PRN Nausea and/or Vomiting  oxycodone    5 mG/acetaminophen 325 mG 1 Tablet(s) Oral every 6 hours PRN Moderate Pain (4 - 6)      PHYSICAL EXAM:  GENERAL: NAD, well-groomed, well-developed  HEAD:  Atraumatic, Normocephalic  EYES: EOMI, PERRLA, conjunctiva and sclera clear  ENMT: No tonsillar erythema, exudates, or enlargement; Moist mucous membranes, Good dentition, No lesions  NECK: Supple, No JVD, Normal thyroid  NERVOUS SYSTEM:  Alert & Oriented X3, Good concentration; Motor Strength 5/5 B/L upper and lower extremities; DTRs 2+ intact and symmetric  CHEST/LUNG: Clear to percussion bilaterally; No rales, rhonchi, wheezing, or rubs  HEART: Regular rate and rhythm; No murmurs, rubs, or gallops  ABDOMEN: Soft, Nontender, Nondistended; Bowel sounds present  EXTREMITIES:  2+ Peripheral Pulses, No clubbing, cyanosis, or edema  LYMPH: No lymphadenopathy noted  SKIN: No rashes or lesions  LABS:                        10.5   5.13  )-----------( 143      ( 21 Oct 2020 06:19 )             31.3     10-    140  |  109<H>  |  5<L>  ----------------------------<  114<H>  3.2<L>   |  23  |  0.49<L>    Ca    8.3<L>      22 Oct 2020 07:39  Phos  2.6     10  Mg     2.3     10-22    TPro  6.8  /  Alb  3.0<L>  /  TBili  1.5<H>  /  DBili  x   /  AST  51<H>  /  ALT  172<H>  /  AlkPhos  197<H>  10    PT/INR - ( 22 Oct 2020 07:39 )   PT: 12.6 sec;   INR: 1.06 ratio           Urinalysis Basic - ( 20 Oct 2020 15:13 )    Color: Yellow / Appearance: Clear / S.020 / pH: x  Gluc: x / Ketone: Moderate  / Bili: Moderate / Urobili: 4   Blood: x / Protein: 100 / Nitrite: Negative   Leuk Esterase: Small / RBC: 2-5 /HPF / WBC 3-5 /HPF   Sq Epi: x / Non Sq Epi: Occasional /HPF / Bacteria: x      CAPILLARY BLOOD GLUCOSE            Urinalysis Basic - ( 20 Oct 2020 15:13 )    Color: Yellow / Appearance: Clear / S.020 / pH: x  Gluc: x / Ketone: Moderate  / Bili: Moderate / Urobili: 4   Blood: x / Protein: 100 / Nitrite: Negative   Leuk Esterase: Small / RBC: 2-5 /HPF / WBC 3-5 /HPF   Sq Epi: x / Non Sq Epi: Occasional /HPF / Bacteria: x

## 2020-10-22 NOTE — CONSULT NOTE ADULT - ATTENDING COMMENTS
Patient was seen and examined by me on 10/22/2020 ,interim events noted,labs and radiology studies reviewed.  Arslan Alves MD,FACC.  [address] [place] [address].  [id number].  [phone]
pt was seen  RUQ abdominal pain with elevated lfts  For HIDA scan
I was physically present for the key portions of the evaluation and management (E/M) service provided.  The patient was personally seen and examined at bedside.  I have edited the note as appropriate.       MRCP noted no role for ERCP   Cholecystectomy as per surgical reccs   Thank you for your consultation and allowing  me to participate in the care of your patients. If you have further questions please contact me at [phone].     Sami Andrade M.D.       _________________________________________________________________________________________________  [facility]  [address] [place] [address]  Office: [phone]    Subhash Tsai PA-C  ___________________________________________________________________________________________________

## 2020-10-22 NOTE — PROGRESS NOTE ADULT - PROBLEM SELECTOR PLAN 6
Gisela Kaplan was in the clinic today to see Ashwin Kim MD for   Chief Complaint   Patient presents with   • Follow-up     Abdominal wall abscess DOS 4/7   .    Please note, her blood pressures were elevated x2 while in the clinic.    BP Readings from Last 1 Encounters:   04/16/20 1204 (!) 138/92   04/16/20 1027 (!) 150/96       Please review with PCP and follow up with patient as appropriate.     DVT ppx- SQ Lovenox  Held prior to OR

## 2020-10-23 ENCOUNTER — TRANSCRIPTION ENCOUNTER (OUTPATIENT)
Age: 65
End: 2020-10-23

## 2020-10-23 LAB
ALBUMIN SERPL ELPH-MCNC: 2.9 G/DL — LOW (ref 3.5–5)
ALP SERPL-CCNC: 188 U/L — HIGH (ref 40–120)
ALT FLD-CCNC: 160 U/L DA — HIGH (ref 10–60)
ANION GAP SERPL CALC-SCNC: 7 MMOL/L — SIGNIFICANT CHANGE UP (ref 5–17)
AST SERPL-CCNC: 99 U/L — HIGH (ref 10–40)
BASOPHILS # BLD AUTO: 0.01 K/UL — SIGNIFICANT CHANGE UP (ref 0–0.2)
BASOPHILS NFR BLD AUTO: 0.1 % — SIGNIFICANT CHANGE UP (ref 0–2)
BILIRUB SERPL-MCNC: 1.3 MG/DL — HIGH (ref 0.2–1.2)
BUN SERPL-MCNC: 5 MG/DL — LOW (ref 7–18)
CALCIUM SERPL-MCNC: 8.6 MG/DL — SIGNIFICANT CHANGE UP (ref 8.4–10.5)
CHLORIDE SERPL-SCNC: 107 MMOL/L — SIGNIFICANT CHANGE UP (ref 96–108)
CO2 SERPL-SCNC: 25 MMOL/L — SIGNIFICANT CHANGE UP (ref 22–31)
CREAT SERPL-MCNC: 0.5 MG/DL — SIGNIFICANT CHANGE UP (ref 0.5–1.3)
EOSINOPHIL # BLD AUTO: 0 K/UL — SIGNIFICANT CHANGE UP (ref 0–0.5)
EOSINOPHIL NFR BLD AUTO: 0 % — SIGNIFICANT CHANGE UP (ref 0–6)
GLUCOSE SERPL-MCNC: 101 MG/DL — HIGH (ref 70–99)
HCT VFR BLD CALC: 34.3 % — LOW (ref 34.5–45)
HGB BLD-MCNC: 11.2 G/DL — LOW (ref 11.5–15.5)
IMM GRANULOCYTES NFR BLD AUTO: 0.6 % — SIGNIFICANT CHANGE UP (ref 0–1.5)
LYMPHOCYTES # BLD AUTO: 1.27 K/UL — SIGNIFICANT CHANGE UP (ref 1–3.3)
LYMPHOCYTES # BLD AUTO: 18.6 % — SIGNIFICANT CHANGE UP (ref 13–44)
MAGNESIUM SERPL-MCNC: 2.2 MG/DL — SIGNIFICANT CHANGE UP (ref 1.6–2.6)
MCHC RBC-ENTMCNC: 29.9 PG — SIGNIFICANT CHANGE UP (ref 27–34)
MCHC RBC-ENTMCNC: 32.7 GM/DL — SIGNIFICANT CHANGE UP (ref 32–36)
MCV RBC AUTO: 91.5 FL — SIGNIFICANT CHANGE UP (ref 80–100)
MONOCYTES # BLD AUTO: 0.59 K/UL — SIGNIFICANT CHANGE UP (ref 0–0.9)
MONOCYTES NFR BLD AUTO: 8.6 % — SIGNIFICANT CHANGE UP (ref 2–14)
NEUTROPHILS # BLD AUTO: 4.93 K/UL — SIGNIFICANT CHANGE UP (ref 1.8–7.4)
NEUTROPHILS NFR BLD AUTO: 72.1 % — SIGNIFICANT CHANGE UP (ref 43–77)
NRBC # BLD: 0 /100 WBCS — SIGNIFICANT CHANGE UP (ref 0–0)
PHOSPHATE SERPL-MCNC: 3.1 MG/DL — SIGNIFICANT CHANGE UP (ref 2.5–4.5)
PLATELET # BLD AUTO: 186 K/UL — SIGNIFICANT CHANGE UP (ref 150–400)
POTASSIUM SERPL-MCNC: 3.6 MMOL/L — SIGNIFICANT CHANGE UP (ref 3.5–5.3)
POTASSIUM SERPL-SCNC: 3.6 MMOL/L — SIGNIFICANT CHANGE UP (ref 3.5–5.3)
PROT SERPL-MCNC: 6.7 G/DL — SIGNIFICANT CHANGE UP (ref 6–8.3)
RBC # BLD: 3.75 M/UL — LOW (ref 3.8–5.2)
RBC # FLD: 14.4 % — SIGNIFICANT CHANGE UP (ref 10.3–14.5)
SODIUM SERPL-SCNC: 139 MMOL/L — SIGNIFICANT CHANGE UP (ref 135–145)
WBC # BLD: 6.84 K/UL — SIGNIFICANT CHANGE UP (ref 3.8–10.5)
WBC # FLD AUTO: 6.84 K/UL — SIGNIFICANT CHANGE UP (ref 3.8–10.5)

## 2020-10-23 RX ORDER — TRAMADOL HYDROCHLORIDE 50 MG/1
1 TABLET ORAL
Qty: 12 | Refills: 0
Start: 2020-10-23 | End: 2020-10-25

## 2020-10-23 RX ADMIN — Medication 500 MILLIGRAM(S): at 13:41

## 2020-10-23 RX ADMIN — OXYCODONE AND ACETAMINOPHEN 1 TABLET(S): 5; 325 TABLET ORAL at 18:13

## 2020-10-23 RX ADMIN — Medication 200 MILLIGRAM(S): at 10:20

## 2020-10-23 RX ADMIN — ONDANSETRON 4 MILLIGRAM(S): 8 TABLET, FILM COATED ORAL at 11:54

## 2020-10-23 RX ADMIN — ONDANSETRON 4 MILLIGRAM(S): 8 TABLET, FILM COATED ORAL at 18:13

## 2020-10-23 RX ADMIN — Medication 1000 MILLIGRAM(S): at 01:00

## 2020-10-23 RX ADMIN — Medication 200 MILLIGRAM(S): at 21:13

## 2020-10-23 RX ADMIN — Medication 500 MILLIGRAM(S): at 05:37

## 2020-10-23 RX ADMIN — Medication 500 MILLIGRAM(S): at 21:13

## 2020-10-23 RX ADMIN — Medication 400 MILLIGRAM(S): at 00:35

## 2020-10-23 RX ADMIN — OXYCODONE AND ACETAMINOPHEN 1 TABLET(S): 5; 325 TABLET ORAL at 05:00

## 2020-10-23 RX ADMIN — OXYCODONE AND ACETAMINOPHEN 1 TABLET(S): 5; 325 TABLET ORAL at 05:40

## 2020-10-23 RX ADMIN — OXYCODONE AND ACETAMINOPHEN 1 TABLET(S): 5; 325 TABLET ORAL at 19:10

## 2020-10-23 NOTE — PROGRESS NOTE ADULT - ASSESSMENT
seen and examined vsstable afebrile physical unchaged    s/p lap katherine  mild pain  abd soft has lap katherine surgical site  without cellulitis or discharge minimal tenderness ( seen with surgical attending)  pt is walking around with mild pain  no nausea vomiting  labs noted   a/p s/p lap katherine  seen by GI no intervention  oob in chair spirometery.  D/c planning as per surgery

## 2020-10-23 NOTE — PROGRESS NOTE ADULT - ATTENDING COMMENTS
Patient was seen and examined by me on 10/23/2020,interim events noted,labs and radiology studies reviewed.  Arslan Alves MD,FACC.  [address] [place] [address].  [facility].  [phone]
pt seen and f/u discussed  stable

## 2020-10-23 NOTE — PROGRESS NOTE ADULT - SUBJECTIVE AND OBJECTIVE BOX
AMD (Wet) Counseling:  I have reviewed with the patient the diagnosis of wet macular degeneration and its pathophysiology.   I further explained that this condition is a severe eye disease which should be monitored and treated by a retinal specialist. HPI:  Patient, 65 year old female from home with no significant PMH presents to the ED with RUQ and right flank pain for 2 days. According to patient, the pain started 2 days ago and initially was bearable but increased in intensity now which is why she came to the hospital. She denies any association with eating and says she was walking when it happened. She took aleve yesterday with some relief but nothing today. Denies fevers, nausea, vomiting. Patient endorses chills yesterday and constipation occasionally. She does not follow with any doctors. Patient also complains of having dark urine lately but no dysuria.     In ED, patient is in NAD.   Vitals:  /72  HR 96  Afebrile  RR 18 (20 Oct 2020 19:34)      Patient is a 65y old  Female who presents with a chief complaint of abdominal pain (23 Oct 2020 16:25)      INTERVAL HPI/OVERNIGHT EVENTS:  T(C): 36.9 (10-23-20 @ 13:44), Max: 37 (10-23-20 @ 05:40)  HR: 74 (10-23-20 @ 13:44) (58 - 78)  BP: 122/70 (10-23-20 @ 13:44) (105/57 - 133/63)  RR: 16 (10-23-20 @ 13:44) (12 - 17)  SpO2: 98% (10-23-20 @ 13:44) (95% - 99%)  Wt(kg): --  I&O's Summary    22 Oct 2020 07:01  -  23 Oct 2020 07:00  --------------------------------------------------------  IN: 1150 mL / OUT: 30 mL / NET: 1120 mL        REVIEW OF SYSTEMS: denies fever, chills, SOB, palpitations, chest pain, abdominal pain, nausea, vomitting, diarrhea, constipation, dizziness    MEDICATIONS  (STANDING):  ciprofloxacin   IVPB 400 milliGRAM(s) IV Intermittent every 12 hours  dextrose 5% + sodium chloride 0.9%. 1000 milliLiter(s) (100 mL/Hr) IV Continuous <Continuous>  metroNIDAZOLE    Tablet 500 milliGRAM(s) Oral every 8 hours    MEDICATIONS  (PRN):  ondansetron Injectable 4 milliGRAM(s) IV Push every 6 hours PRN Nausea  oxycodone    5 mG/acetaminophen 325 mG 1 Tablet(s) Oral every 6 hours PRN Moderate Pain (4 - 6)      PHYSICAL EXAM:  GENERAL: NAD, well-groomed, well-developed  HEAD:  Atraumatic, Normocephalic  EYES: EOMI, PERRLA, conjunctiva and sclera clear  ENMT: No tonsillar erythema, exudates, or enlargement; Moist mucous membranes, Good dentition, No lesions  NECK: Supple, No JVD, Normal thyroid  NERVOUS SYSTEM:  Alert & Oriented X3, Good concentration; Motor Strength 5/5 B/L upper and lower extremities; DTRs 2+ intact and symmetric  CHEST/LUNG: Clear to percussion bilaterally; No rales, rhonchi, wheezing, or rubs  HEART: Regular rate and rhythm; No murmurs, rubs, or gallops  ABDOMEN: Soft, Nontender, Nondistended; Bowel sounds present  EXTREMITIES:  2+ Peripheral Pulses, No clubbing, cyanosis, or edema  LYMPH: No lymphadenopathy noted  SKIN: No rashes or lesions  LABS:                        11.2   6.84  )-----------( 186      ( 23 Oct 2020 06:20 )             34.3     10-23    139  |  107  |  5<L>  ----------------------------<  101<H>  3.6   |  25  |  0.50    Ca    8.6      23 Oct 2020 06:20  Phos  3.1     10-23  Mg     2.2     10-23    TPro  6.7  /  Alb  2.9<L>  /  TBili  1.3<H>  /  DBili  x   /  AST  99<H>  /  ALT  160<H>  /  AlkPhos  188<H>  10-23    PT/INR - ( 22 Oct 2020 07:39 )   PT: 12.6 sec;   INR: 1.06 ratio             CAPILLARY BLOOD GLUCOSE

## 2020-10-23 NOTE — PROGRESS NOTE ADULT - ASSESSMENT
65y.o. Female s/p lap katherine pod # 1     -regular diet   -oob  -Pain control prn  -DVT ppx  -Incentive spirometry  -d/c planning

## 2020-10-23 NOTE — DISCHARGE NOTE PROVIDER - CARE PROVIDERS DIRECT ADDRESSES
[email or web address]

## 2020-10-23 NOTE — PROGRESS NOTE ADULT - SUBJECTIVE AND OBJECTIVE BOX
Summary:   65y  Female      Subjective:   Incisional pain     Objective:    MEDICATIONS  (STANDING):  ciprofloxacin   IVPB 400 milliGRAM(s) IV Intermittent every 12 hours  dextrose 5% + sodium chloride 0.9%. 1000 milliLiter(s) (100 mL/Hr) IV Continuous <Continuous>  metroNIDAZOLE    Tablet 500 milliGRAM(s) Oral every 8 hours    MEDICATIONS  (PRN):  ondansetron Injectable 4 milliGRAM(s) IV Push every 6 hours PRN Nausea  oxycodone    5 mG/acetaminophen 325 mG 1 Tablet(s) Oral every 6 hours PRN Moderate Pain (4 - 6)              Vital Signs Last 24 Hrs  T(C): 36.9 (23 Oct 2020 13:44), Max: 37.1 (22 Oct 2020 19:09)  T(F): 98.4 (23 Oct 2020 13:44), Max: 98.7 (22 Oct 2020 19:09)  HR: 74 (23 Oct 2020 13:44) (58 - 89)  BP: 122/70 (23 Oct 2020 13:44) (105/57 - 133/63)  BP(mean): 73 (22 Oct 2020 20:39) (67 - 83)  RR: 16 (23 Oct 2020 13:44) (12 - 18)  SpO2: 98% (23 Oct 2020 13:44) (95% - 100%)      General:  Well developed, well nourished, alert and active, no pallor, NAD.  HEENT:    Normal appearance of conjunctiva, ears, nose, lips, oropharynx, and oral mucosa, anicteric.  Neck:  No masses, no asymmetry.  Lymph Nodes:  No lymphadenopathy.   Cardiovascular:  RRR normal S1/S2, no murmur.  Respiratory:  CTA B/L, normal respiratory effort.   Abdominal:   soft, no masses or tenderness, normoactive BS, NT/ND, no HSM.  Extremities:   No clubbing or cyanosis, normal capillary refill, no edema.   Skin:   No rash, jaundice, lesions, eczema.   Musculoskeletal:  No joint swelling, erythema or tenderness.   Neuro: No focal deficits.   Other:       LABS:                        11.2   6.84  )-----------( 186      ( 23 Oct 2020 06:20 )             34.3     10-23    139  |  107  |  5<L>  ----------------------------<  101<H>  3.6   |  25  |  0.50    Ca    8.6      23 Oct 2020 06:20  Phos  3.1     10-23  Mg     2.2     10-23    TPro  6.7  /  Alb  2.9<L>  /  TBili  1.3<H>  /  DBili  x   /  AST  99<H>  /  ALT  160<H>  /  AlkPhos  188<H>  10-23    PT/INR - ( 22 Oct 2020 07:39 )   PT: 12.6 sec;   INR: 1.06 ratio               RADIOLOGY & ADDITIONAL TESTS:  < from: MR Abdomen w/ IV Cont (10.21.20 @ 17:49) >    EXAM:  MR ABDOMEN IC                            PROCEDURE DATE:  10/21/2020          INTERPRETATION:  MR ABDOMEN WITH IV CONTRAST    CLINICAL INFORMATION: Right upper quadrant pain    eval for stone    TECHNIQUE: Multiplanar T1-weighted, T2-weighted, and diffusion weighted sequences were obtained of the abdomen, including dynamic post-contrast T1-weighted sequences.  3D heavily T2-weighted thin-section MRCP was also performed.    Field Strength: 1.5T    Contrast: 6.5 cc Gadavist.    COMPARISON:Same-day HIDA scan and abdominal ultrasound 1 day prior.    FINDINGS:    LOWER CHEST: Clear.    LIVER: Simple cyst in the right lobe.  BILE DUCTS: 10 mm common bile that with normal distal tapering. No filling defect. No intrahepatic dilatation.  GALLBLADDER: Small stones without wall thickening or inflammation.  SPLEEN: Normal.  PANCREAS: Normal.  ADRENALS: Normal.  KIDNEYS/URETERS: Symmetric nephrograms. No hydronephrosis.    VISUALIZED PORTIONS:    BOWEL: No bowel-related abnormality.  PERITONEUM: No ascites.  VESSELS:  Normal caliber aorta.  RETROPERITONEUM/LYMPH NODES: No adenopathy.  ABDOMINAL WALL: Normal.  BONES: No aggressive lesion.    IMPRESSION:    10 mm common bile duct without choledocholithiasis or obstructive pathology identified. Correlation with LFTs is needed to determine the clinical significance.    Small gallstones without secondary signs of acute cholecystitis.            LIDIA ZHOU M.D., ATTENDING RADIOLOGIST  This document has been electronically signed. Oct 21 2020  7:00PM    < end of copied text >

## 2020-10-23 NOTE — DISCHARGE NOTE PROVIDER - NSDCCPCAREPLAN_GEN_ALL_CORE_FT
PRINCIPAL DISCHARGE DIAGNOSIS  Diagnosis: Choledocholithiasis  Assessment and Plan of Treatment: s/p cholecystetcomy   Please follow up with Dr. Mares within 1 week   Diet as tolerated   No heavy lifting or straining

## 2020-10-23 NOTE — PROGRESS NOTE ADULT - ASSESSMENT
65 year old female from home with no significant PMH presents to the ED with RUQ and right flank pain for 2 days.Patient was  being admitted to medicine for RUQ pain and choledocholithiasis.      Problem/Plan - 1:  ·  Problem: Cholelithiasis without obstruction.  Plan: Patient presented with RUQ and flank pain  On admission, afebrile, no leucocytosis  Total bili 4.3, , ALT//136  LFTs show obstructive pattern  USG abdomen shows cholelithiasis without evidence for acute cholecystitis.  Scheduled for Cholecystectomy  Patient's Revised Cardiac Risk Index (RCRI) score is 0 ( 3 % risk) . Patient is at low  risk of  adverse perioperative cardiac events undergoing  intermediaterisk surgery. No further cardiac testing is needed prior to patient's surgery.               Problem/Plan - 2:  ·  Problem: Need for prophylactic measure.  Plan: [ ] Previous VTE                                    3  [ ] Thrombophilia                                  2  [ ] Lower limb paralysis                        2  (unable to hold up >15 seconds)    [ ] Current Cancer (within 6 months)        2   [ ] Immobilization > 24 hrs                    1  [ ] ICU/CCU stay > 24 hrs                      1  [x ] Age > 60                                         1   lovenox for dvt prophylaxis.

## 2020-10-23 NOTE — PROGRESS NOTE ADULT - SUBJECTIVE AND OBJECTIVE BOX
66 y/o female s/p laparoscopic cholecystectomy POD # 1. Pt resting comfortably, complains of sharp incisional pain   Tolerating pain with meds.  Tolerating clear liquid diet.  Denies N/V.    Vital Signs Last 24 Hrs  T(C): 37 (23 Oct 2020 05:40), Max: 37.1 (22 Oct 2020 15:52)  T(F): 98.6 (23 Oct 2020 05:40), Max: 98.8 (22 Oct 2020 15:52)  HR: 65 (23 Oct 2020 05:40) (58 - 89)  BP: 120/57 (23 Oct 2020 05:40) (105/57 - 133/63)  BP(mean): 73 (22 Oct 2020 20:39) (67 - 83)  RR: 17 (23 Oct 2020 05:40) (12 - 18)  SpO2: 95% (23 Oct 2020 05:40) (95% - 100%)                          11.2   6.84  )-----------( 186      ( 23 Oct 2020 06:20 )             34.3   10-23    139  |  107  |  5<L>  ----------------------------<  101<H>  3.6   |  25  |  0.50    Ca    8.6      23 Oct 2020 06:20  Phos  3.1     10-23  Mg     2.2     10-23    TPro  6.7  /  Alb  2.9<L>  /  TBili  1.3<H>  /  DBili  x   /  AST  99<H>  /  ALT  160<H>  /  AlkPhos  188<H>  10-23    Physical:  Gen: A&O x3  Abd: Soft ND, Dressing C/D/I

## 2020-10-23 NOTE — DISCHARGE NOTE PROVIDER - CARE PROVIDER_API CALL
Huang Mares  SURGERY  [address] [facility] [place] [address]  Phone: [phone]  Fax: [phone]  Follow Up Time: 1 week

## 2020-10-23 NOTE — PROGRESS NOTE ADULT - SUBJECTIVE AND OBJECTIVE BOX
Surgery    Subjective:  Pt resting comfortably. No acute complaints  Tolerating pain with meds.  Tolerating clear liquid diet.  Denies N/V.  Voided post op.    T(C): 36.9 (10-22-20 @ 22:18), Max: 37.1 (10-22-20 @ 15:52)  HR: 70 (10-22-20 @ 22:18) (65 - 89)  BP: 118/68 (10-22-20 @ 22:18) (109/54 - 133/63)  RR: 17 (10-22-20 @ 22:18) (12 - 18)  SpO2: 95% (10-22-20 @ 22:18) (95% - 100%)    Physical:  Gen: A&O x3  Abd: Soft ND, Dressing C/D/I

## 2020-10-23 NOTE — DISCHARGE NOTE PROVIDER - HOSPITAL COURSE
65 year old female from home with no significant PMH presents to the ED with RUQ and right flank pain for 2 days. According to patient, the pain started 2 days ago and initially was bearable but increased in intensity now which is why she came to the hospital. She denies any association with eating and says she was walking when it happened. She took aleve yesterday with some relief but nothing today. Denies fevers, nausea, vomiting. Patient endorses chills yesterday and constipation occasionally.   Patient was noted to have acute cholecystitis. Patient tolerated procedure well for d/c home with outpatient follow up

## 2020-10-23 NOTE — PROGRESS NOTE ADULT - ASSESSMENT
65y.o. Female s/p lap katherine    -Clear liquid diet, plan to advance in AM  -Abx  -Pain control prn  -DVT ppx  -Incentive spirometry  -d/c planning in AM

## 2020-10-23 NOTE — PROGRESS NOTE ADULT - SUBJECTIVE AND OBJECTIVE BOX
DATE OF SERVICE: 10/23/2020       Patient seen and examined.Interim events reviewed.        HPI:-Patient, 65 year old female from home with no significant PMH presents to the ED with RUQ and right flank pain for 2 days. According to patient, the pain started 2 days ago and initially was bearable but increased in intensity now which is why she came to the hospital. She denies any association with eating and says she was walking when it happened. She took aleve yesterday with some relief but nothing today. Denies fevers, nausea, vomiting. Patient endorses chills yesterday and constipation occasionally. She does not follow with any doctors. Patient also complains of having dark urine lately but no dysuria.       · Subjective and Objective:    s/p laparoscopic cholecystectomy POD # 1. Pt resting comfortably, complains of sharp incisional pain   Tolerating pain with meds.  Tolerating clear liquid diet.  Denies N/V.        MEDICATIONS  (STANDING):  ciprofloxacin   IVPB 400 milliGRAM(s) IV Intermittent every 12 hours  dextrose 5% + sodium chloride 0.9%. 1000 milliLiter(s) (100 mL/Hr) IV Continuous <Continuous>  metroNIDAZOLE    Tablet 500 milliGRAM(s) Oral every 8 hours    MEDICATIONS  (PRN):  ondansetron Injectable 4 milliGRAM(s) IV Push every 6 hours PRN Nausea  oxycodone    5 mG/acetaminophen 325 mG 1 Tablet(s) Oral every 6 hours PRN Moderate Pain (4 - 6)        Vital Signs Last 24 Hrs  T(C): 37 (23 Oct 2020 05:40), Max: 37.1 (22 Oct 2020 15:52)  T(F): 98.6 (23 Oct 2020 05:40), Max: 98.8 (22 Oct 2020 15:52)  HR: 65 (23 Oct 2020 05:40) (58 - 89)  BP: 120/57 (23 Oct 2020 05:40) (105/57 - 133/63)  BP(mean): 73 (22 Oct 2020 20:39) (67 - 83)  RR: 17 (23 Oct 2020 05:40) (12 - 18)  SpO2: 95% (23 Oct 2020 05:40) (95% - 100%)    General:  Well developed, well nourished, alert and active, no pallor, NAD.  HEENT:    Normal appearance of conjunctiva, ears, nose, lips, oropharynx, and oral mucosa, anicteric.  Neck:  No masses, no asymmetry.  Lymph Nodes:  No lymphadenopathy.   Cardiovascular:  RRR normal S1/S2, no murmur.  Respiratory:  CTA B/L, normal respiratory effort.   Abdominal:   soft, no masses or tenderness, normoactive BS, NT/ND, no HSM.  Extremities:   No clubbing or cyanosis, normal capillary refill, no edema.   Skin:   No rash, jaundice, lesions, eczema.   Musculoskeletal:  No joint swelling, erythema or tenderness.   Neuro: No focal deficits.       LABS:                        11.2   6.84  )-----------( 186      ( 23 Oct 2020 06:20 )             34.3     10-23    139  |  107  |  5<L>  ----------------------------<  101<H>  3.6   |  25  |  0.50    Ca    8.6      23 Oct 2020 06:20  Phos  3.1     10-23  Mg     2.2     10-23    TPro  6.7  /  Alb  2.9<L>  /  TBili  1.3<H>  /  DBili  x   /  AST  99<H>  /  ALT  160<H>  /  AlkPhos  188<H>  10-23    PT/INR - ( 22 Oct 2020 07:39 )   PT: 12.6 sec;   INR: 1.06 ratio           RADIOLOGY & ADDITIONAL TESTS:  EXAM:  MR ABDOMEN IC                        IMPRESSION:  10 mm common bile duct without choledocholithiasis or obstructive pathology identified. Correlation with LFTs is needed to determine the clinical significance.  Small gallstones without secondary signs of acute cholecystitis.

## 2020-10-24 ENCOUNTER — TRANSCRIPTION ENCOUNTER (OUTPATIENT)
Age: 65
End: 2020-10-24

## 2020-10-24 VITALS
RESPIRATION RATE: 16 BRPM | HEART RATE: 80 BPM | DIASTOLIC BLOOD PRESSURE: 65 MMHG | OXYGEN SATURATION: 97 % | SYSTOLIC BLOOD PRESSURE: 119 MMHG | TEMPERATURE: 98 F

## 2020-10-24 PROCEDURE — 83690 ASSAY OF LIPASE: CPT

## 2020-10-24 PROCEDURE — 93306 TTE W/DOPPLER COMPLETE: CPT

## 2020-10-24 PROCEDURE — 84439 ASSAY OF FREE THYROXINE: CPT

## 2020-10-24 PROCEDURE — 74182 MRI ABDOMEN W/CONTRAST: CPT

## 2020-10-24 PROCEDURE — 84481 FREE ASSAY (FT-3): CPT

## 2020-10-24 PROCEDURE — C1889: CPT

## 2020-10-24 PROCEDURE — 80053 COMPREHEN METABOLIC PANEL: CPT

## 2020-10-24 PROCEDURE — 36415 COLL VENOUS BLD VENIPUNCTURE: CPT

## 2020-10-24 PROCEDURE — 82248 BILIRUBIN DIRECT: CPT

## 2020-10-24 PROCEDURE — 83036 HEMOGLOBIN GLYCOSYLATED A1C: CPT

## 2020-10-24 PROCEDURE — 86900 BLOOD TYPING SEROLOGIC ABO: CPT

## 2020-10-24 PROCEDURE — 83605 ASSAY OF LACTIC ACID: CPT

## 2020-10-24 PROCEDURE — 80061 LIPID PANEL: CPT

## 2020-10-24 PROCEDURE — 87086 URINE CULTURE/COLONY COUNT: CPT

## 2020-10-24 PROCEDURE — 83735 ASSAY OF MAGNESIUM: CPT

## 2020-10-24 PROCEDURE — 85025 COMPLETE CBC W/AUTO DIFF WBC: CPT

## 2020-10-24 PROCEDURE — 96375 TX/PRO/DX INJ NEW DRUG ADDON: CPT

## 2020-10-24 PROCEDURE — 86850 RBC ANTIBODY SCREEN: CPT

## 2020-10-24 PROCEDURE — 87635 SARS-COV-2 COVID-19 AMP PRB: CPT

## 2020-10-24 PROCEDURE — 84100 ASSAY OF PHOSPHORUS: CPT

## 2020-10-24 PROCEDURE — 82607 VITAMIN B-12: CPT

## 2020-10-24 PROCEDURE — 96374 THER/PROPH/DIAG INJ IV PUSH: CPT

## 2020-10-24 PROCEDURE — 88304 TISSUE EXAM BY PATHOLOGIST: CPT

## 2020-10-24 PROCEDURE — 78227 HEPATOBIL SYST IMAGE W/DRUG: CPT

## 2020-10-24 PROCEDURE — 84443 ASSAY THYROID STIM HORMONE: CPT

## 2020-10-24 PROCEDURE — 71046 X-RAY EXAM CHEST 2 VIEWS: CPT

## 2020-10-24 PROCEDURE — 81001 URINALYSIS AUTO W/SCOPE: CPT

## 2020-10-24 PROCEDURE — 86901 BLOOD TYPING SEROLOGIC RH(D): CPT

## 2020-10-24 PROCEDURE — 76705 ECHO EXAM OF ABDOMEN: CPT

## 2020-10-24 PROCEDURE — 86769 SARS-COV-2 COVID-19 ANTIBODY: CPT

## 2020-10-24 PROCEDURE — 85610 PROTHROMBIN TIME: CPT

## 2020-10-24 PROCEDURE — 84484 ASSAY OF TROPONIN QUANT: CPT

## 2020-10-24 PROCEDURE — 86803 HEPATITIS C AB TEST: CPT

## 2020-10-24 PROCEDURE — 82247 BILIRUBIN TOTAL: CPT

## 2020-10-24 PROCEDURE — 99285 EMERGENCY DEPT VISIT HI MDM: CPT

## 2020-10-24 RX ADMIN — OXYCODONE AND ACETAMINOPHEN 1 TABLET(S): 5; 325 TABLET ORAL at 03:30

## 2020-10-24 RX ADMIN — OXYCODONE AND ACETAMINOPHEN 1 TABLET(S): 5; 325 TABLET ORAL at 14:45

## 2020-10-24 RX ADMIN — Medication 500 MILLIGRAM(S): at 05:23

## 2020-10-24 RX ADMIN — Medication 200 MILLIGRAM(S): at 09:30

## 2020-10-24 RX ADMIN — Medication 500 MILLIGRAM(S): at 13:56

## 2020-10-24 RX ADMIN — OXYCODONE AND ACETAMINOPHEN 1 TABLET(S): 5; 325 TABLET ORAL at 02:27

## 2020-10-24 RX ADMIN — OXYCODONE AND ACETAMINOPHEN 1 TABLET(S): 5; 325 TABLET ORAL at 13:56

## 2020-10-24 NOTE — PROGRESS NOTE ADULT - SUBJECTIVE AND OBJECTIVE BOX
HPI:  Patient, 65 year old female from home with no significant PMH presents to the ED with RUQ and right flank pain for 2 days. According to patient, the pain started 2 days ago and initially was bearable but increased in intensity now which is why she came to the hospital. She denies any association with eating and says she was walking when it happened. She took aleve yesterday with some relief but nothing today. Denies fevers, nausea, vomiting. Patient endorses chills yesterday and constipation occasionally. She does not follow with any doctors. Patient also complains of having dark urine lately but no dysuria.     In ED, patient is in NAD.   Vitals:  /72  HR 96  Afebrile  RR 18 (20 Oct 2020 19:34)      Patient is a 65y old  Female who presents with a chief complaint of abdominal pain (24 Oct 2020 08:20)      INTERVAL HPI/OVERNIGHT EVENTS:  T(C): 36.8 (10-24-20 @ 05:15), Max: 36.9 (10-23-20 @ 13:44)  HR: 81 (10-24-20 @ 05:15) (69 - 81)  BP: 100/61 (10-24-20 @ 05:15) (100/61 - 122/70)  RR: 18 (10-24-20 @ 05:15) (16 - 18)  SpO2: 97% (10-24-20 @ 05:15) (95% - 98%)  Wt(kg): --  I&O's Summary      REVIEW OF SYSTEMS: denies fever, chills, SOB, palpitations, chest pain, abdominal pain, nausea, vomitting, diarrhea, constipation, dizziness    MEDICATIONS  (STANDING):  ciprofloxacin   IVPB 400 milliGRAM(s) IV Intermittent every 12 hours  dextrose 5% + sodium chloride 0.9%. 1000 milliLiter(s) (100 mL/Hr) IV Continuous <Continuous>  metroNIDAZOLE    Tablet 500 milliGRAM(s) Oral every 8 hours    MEDICATIONS  (PRN):  ondansetron Injectable 4 milliGRAM(s) IV Push every 6 hours PRN Nausea  oxycodone    5 mG/acetaminophen 325 mG 1 Tablet(s) Oral every 6 hours PRN Moderate Pain (4 - 6)      PHYSICAL EXAM:  GENERAL: NAD, well-groomed, well-developed  HEAD:  Atraumatic, Normocephalic  EYES: EOMI, PERRLA, conjunctiva and sclera clear  ENMT: No tonsillar erythema, exudates, or enlargement; Moist mucous membranes, Good dentition, No lesions  NECK: Supple, No JVD, Normal thyroid  NERVOUS SYSTEM:  Alert & Oriented X3, Good concentration; Motor Strength 5/5 B/L upper and lower extremities; DTRs 2+ intact and symmetric  CHEST/LUNG: Clear to percussion bilaterally; No rales, rhonchi, wheezing, or rubs  HEART: Regular rate and rhythm; No murmurs, rubs, or gallops  ABDOMEN: Soft, Nontender, Nondistended; Bowel sounds present  EXTREMITIES:  2+ Peripheral Pulses, No clubbing, cyanosis, or edema  LYMPH: No lymphadenopathy noted  SKIN: No rashes or lesions  LABS:                        11.2   6.84  )-----------( 186      ( 23 Oct 2020 06:20 )             34.3     10-23    139  |  107  |  5<L>  ----------------------------<  101<H>  3.6   |  25  |  0.50    Ca    8.6      23 Oct 2020 06:20  Phos  3.1     10-23  Mg     2.2     10-23    TPro  6.7  /  Alb  2.9<L>  /  TBili  1.3<H>  /  DBili  x   /  AST  99<H>  /  ALT  160<H>  /  AlkPhos  188<H>  10-23        CAPILLARY BLOOD GLUCOSE

## 2020-10-24 NOTE — DISCHARGE NOTE NURSING/CASE MANAGEMENT/SOCIAL WORK - NSDCPNINST_GEN_ALL_CORE
Keep the white steri strips clean and dry; may wet during shower, pat dry after. Do not remove let it fall out by itself until seen by Surgeon. No heavy lifting and pushing. Call Monday for follow up appointment with Surgeon

## 2020-10-24 NOTE — DISCHARGE NOTE NURSING/CASE MANAGEMENT/SOCIAL WORK - NURSING SECTION COMPLETE
Losartan discontinued and chlorthalidone started for high blood pressure  pepcid- famotidine - 20mg at night, with or after meal    iliotibial band syndrome - foam roller exercises on youtube  Tylenol as needed for pain     Patient/Caregiver provided printed discharge information.

## 2020-10-24 NOTE — DISCHARGE NOTE NURSING/CASE MANAGEMENT/SOCIAL WORK - NSDCPNDISPN_GEN_ALL_CORE
Opioids not applicable/not prescribed Education provided on the pain management plan of care/Activities of daily living, including home environment that might     exacerbate pain or reduce effectiveness of the pain management plan of care as well as strategies to address these issues/Side effects of pain management treatment/Safe use, storage and disposal of opioids when prescribed

## 2020-10-24 NOTE — PROGRESS NOTE ADULT - SUBJECTIVE AND OBJECTIVE BOX
SUBJECTIVE    Nausea [ ] YES [X ] NO  Vomiting [ ] YES [X ] NO  Voiding normally [X ] YES [ ] NO  Flatus [ X] YES [ ] NO  BM [ ] YES [ X] NO  Pain under control [X ] YES [ ] NO--reports "gas pains" along sides and back  Tolerating reg diet  Ambulated [ X] YES NO [ ]  Using Incentive Spirometer [X ] YES [ ] NO      VITALS    ICU Vital Signs Last 24 Hrs  T(C): 36.8 (24 Oct 2020 05:15), Max: 36.9 (23 Oct 2020 13:44)  T(F): 98.2 (24 Oct 2020 05:15), Max: 98.4 (23 Oct 2020 13:44)  HR: 81 (24 Oct 2020 05:15) (69 - 81)  BP: 100/61 (24 Oct 2020 05:15) (100/61 - 122/70)  BP(mean): --  ABP: --  ABP(mean): --  RR: 18 (24 Oct 2020 05:15) (16 - 18)  SpO2: 97% (24 Oct 2020 05:15) (95% - 98%)    I&O's Detail        PHYSICAL EXAMINATION    Abdomen: soft, ND, appropriately tender along RUQ as expected; no ecchymoses    I&O's Summary      LABS                        11.2   6.84  )-----------( 186      ( 23 Oct 2020 06:20 )             34.3             10-23    139  |  107  |  5<L>  ----------------------------<  101<H>  3.6   |  25  |  0.50    Ca    8.6      23 Oct 2020 06:20  Phos  3.1     10-23  Mg     2.2     10-23    TPro  6.7  /  Alb  2.9<L>  /  TBili  1.3<H>  /  DBili  x   /  AST  99<H>  /  ALT  160<H>  /  AlkPhos  188<H>  10-23    LIVER FUNCTIONS - ( 23 Oct 2020 06:20 )  Alb: 2.9 g/dL / Pro: 6.7 g/dL / ALK PHOS: 188 U/L / ALT: 160 U/L DA / AST: 99 U/L / GGT: x             MEDICATIONS:  MEDICATIONS  (STANDING):  ciprofloxacin   IVPB 400 milliGRAM(s) IV Intermittent every 12 hours  dextrose 5% + sodium chloride 0.9%. 1000 milliLiter(s) (100 mL/Hr) IV Continuous <Continuous>  metroNIDAZOLE    Tablet 500 milliGRAM(s) Oral every 8 hours    MEDICATIONS  (PRN):  ondansetron Injectable 4 milliGRAM(s) IV Push every 6 hours PRN Nausea  oxycodone    5 mG/acetaminophen 325 mG 1 Tablet(s) Oral every 6 hours PRN Moderate Pain (4 - 6)

## 2020-10-24 NOTE — DISCHARGE NOTE NURSING/CASE MANAGEMENT/SOCIAL WORK - PATIENT PORTAL LINK FT
Constitutional: no fever, chills    All other ROS neg except as per HPI
You can access the FollowMyHealth Patient Portal offered by [facility] by registering at the following website: [email or web address]. By joining [employer]’s FollowMyHealth portal, you will also be able to view your health information using other applications (apps) compatible with our system.

## 2020-10-24 NOTE — PROGRESS NOTE ADULT - ASSESSMENT
seen and examined vsstable afebrile physical unchaged  pt denies any cp or sob or palp.  has mild abd pain  without nausea or vomiting  no cough  afebrile  urine ok  labs noted  lft still elevated    a/p s/p la katherine doing ok  as per surgery if full diet tolerates d/c home  d/w pt OOB in chair as much as possible  pt already walking around  pcp, surgery and Labs out pt

## 2020-10-27 LAB — SURGICAL PATHOLOGY STUDY: SIGNIFICANT CHANGE UP

## 2020-10-28 PROBLEM — Z00.00 ENCOUNTER FOR PREVENTIVE HEALTH EXAMINATION: Status: ACTIVE | Noted: 2020-10-28

## 2020-11-03 PROBLEM — K81.9 CHOLECYSTITIS: Status: ACTIVE | Noted: 2020-11-03

## 2020-11-05 ENCOUNTER — APPOINTMENT (OUTPATIENT)
Dept: SURGERY | Facility: CLINIC | Age: 65
End: 2020-11-05

## 2020-11-05 ENCOUNTER — APPOINTMENT (OUTPATIENT)
Dept: SURGERY | Facility: CLINIC | Age: 65
End: 2020-11-05
Payer: MEDICARE

## 2020-11-05 DIAGNOSIS — Z78.9 OTHER SPECIFIED HEALTH STATUS: ICD-10-CM

## 2020-11-05 DIAGNOSIS — K81.9 CHOLECYSTITIS, UNSPECIFIED: ICD-10-CM

## 2020-11-05 DIAGNOSIS — Z90.49 ACQUIRED ABSENCE OF OTHER SPECIFIED PARTS OF DIGESTIVE TRACT: ICD-10-CM

## 2020-11-05 PROCEDURE — 99024 POSTOP FOLLOW-UP VISIT: CPT

## 2020-11-05 NOTE — REASON FOR VISIT
[Post Op: _________] : a [unfilled] post op visit [Family Member] : family member [FreeTextEntry1] : S/P laparoscopic cholecystectomy, 10/22/20

## 2020-11-05 NOTE — ASSESSMENT
[FreeTextEntry1] : Patient is a 65 y.o F who was admitted to [facility], 10/20- 10/24/20 with RUQ and flank pain, found to have acute cholecystitis. Patient is S/P laparoscopic cholecystectomy, 10/22/20. Pathology results c/w chronic and acute cholecystitis, marked, with lymphoid follicles, cholelithiasis. \par Patient has some straining with BM's and some mild nausea, at times. No vomiting. Decreased appetite. \par \par Incision sites healing well and as expected. There is no evidence of infection/complication, and patient is progressing as expected. Post-operative wound care, activities, restrictions and precautions were reinforced. Pathology results were discussed in detail. \par Patient's questions and concerns addressed to patient's satisfaction.\par

## 2020-11-05 NOTE — PLAN
[FreeTextEntry1] : recommended prune juice /PO stool softener to aid with straining \par CMP ordered /Check LFTs \par \par Post operative wound care, activity and restrictions/precautions were reinforced. \par Patient was instructed to refrain from any heavy lifting >10-15 lbs for at least 4 weeks post operatively. \par \par Patient's questions and concerns addressed.\par \par patient will follow up if needed. Warning signs, follow up, and restrictions were discussed with the patient.\par

## 2020-11-05 NOTE — HISTORY OF PRESENT ILLNESS
[de-identified] : Patient is a 65 y.o F who was admitted to [facility], 10/20- 10/24/20 with RUQ and flank pain, found to have acute cholecystitis. Patient is S/P laparoscopic cholecystectomy, 10/22/20. Pathology results c/w chronic and acute cholecystitis, marked, with lymphoid follicles, cholelithiasis. \par Patient without any fevers/chills. Denies abdominal pain. Has some nausea, at various times of the day, at times. No vomiting. Also admits to straining with BM's.

## 2020-11-05 NOTE — PHYSICAL EXAM
[Normal Breath Sounds] : Normal breath sounds [Normal Rate and Rhythm] : normal rate and rhythm [No Rash or Lesion] : No rash or lesion [Alert] : alert [Oriented to Person] : oriented to person [Oriented to Place] : oriented to place [Oriented to Time] : oriented to time [Calm] : calm [JVD] : no jugular venous distention  [de-identified] : A/Ox3; NAD. appears comfortable [de-identified] : EOMI; sclera anicteric. Nasal mucosa pink, septum midline. Oral mucosa pink. Tongue midline, Pharynx without exudates. [de-identified] : Abdomen soft and non tender. Wounds healing well. Port sites with no erythema or drainage. [de-identified] : +ROM, no joint swelling

## 2020-11-06 LAB
ALBUMIN SERPL ELPH-MCNC: 4.6 G/DL
ALP BLD-CCNC: 114 U/L
ALT SERPL-CCNC: 38 U/L
ANION GAP SERPL CALC-SCNC: 12 MMOL/L
AST SERPL-CCNC: 34 U/L
BILIRUB SERPL-MCNC: 0.8 MG/DL
BUN SERPL-MCNC: 13 MG/DL
CALCIUM SERPL-MCNC: 9.2 MG/DL
CHLORIDE SERPL-SCNC: 104 MMOL/L
CO2 SERPL-SCNC: 26 MMOL/L
CREAT SERPL-MCNC: 0.56 MG/DL
GLUCOSE SERPL-MCNC: 93 MG/DL
POTASSIUM SERPL-SCNC: 3.9 MMOL/L
PROT SERPL-MCNC: 7.3 G/DL
SODIUM SERPL-SCNC: 142 MMOL/L

## 2020-11-13 ENCOUNTER — NON-APPOINTMENT (OUTPATIENT)
Age: 65
End: 2020-11-13

## 2020-11-13 DIAGNOSIS — R11.0 NAUSEA: ICD-10-CM

## 2020-11-13 RX ORDER — ONDANSETRON 4 MG/1
4 TABLET ORAL EVERY 8 HOURS
Qty: 9 | Refills: 0 | Status: ACTIVE | COMMUNITY
Start: 2020-11-13 | End: 1900-01-01

## 2021-01-05 NOTE — PROGRESS NOTE ADULT - PROBLEM SELECTOR PROBLEM 7
OVERNIGHT EVENTS:  Multiple seizures overnight. Pt s/p SEEG leads placement    HPI:  1 year 11 month old male with PMH significant for right frontal cortical dysplasia, hx of infantile spasms, Lennox-Gastaut syndrome and  drug resistant epilepsy,  s/p stereotactic craniotomy for resection of seizure foci with electrocorticography on 6/25/20, no complications with anesthesia or bleeding.    (28 Dec 2020 08:28)      Vital Signs Last 24 Hrs  T(C): 37.3 (05 Jan 2021 05:00), Max: 37.4 (04 Jan 2021 17:00)  T(F): 99.1 (05 Jan 2021 05:00), Max: 99.3 (04 Jan 2021 17:00)  HR: 102 (05 Jan 2021 05:00) (90 - 122)  BP: 115/62 (05 Jan 2021 05:00) (96/46 - 125/87)  BP(mean): 73 (05 Jan 2021 05:00) (57 - 82)  RR: 24 (05 Jan 2021 05:00) (22 - 30)  SpO2: 96% (05 Jan 2021 05:00) (95% - 98%)    I&O's Summary    04 Jan 2021 07:01  -  05 Jan 2021 07:00  --------------------------------------------------------  IN: 340 mL / OUT: 261 mL / NET: 79 mL        PHYSICAL EXAM:  Mental Status: Awake, Alert, Affect appropriate  PERRL, EOMI  Motor:  MAEx4 w/ good tone  Head Wrap/EEG in place      Allergies  No Known Allergies  Intolerances        MEDICATIONS:  Antibiotics:  ceFAZolin  IV Intermittent - Peds 470 milliGRAM(s) IV Intermittent every 8 hours    Neuro:  LORazepam IV Push - Peds 1.6 milliGRAM(s) IV Push once PRN     Discharge planning issues

## 2021-08-12 ENCOUNTER — EMERGENCY (EMERGENCY)
Facility: HOSPITAL | Age: 66
LOS: 1 days | Discharge: HOME | End: 2021-08-12
Attending: EMERGENCY MEDICINE
Payer: MEDICARE

## 2021-08-12 VITALS
SYSTOLIC BLOOD PRESSURE: 143 MMHG | HEIGHT: 62 IN | OXYGEN SATURATION: 96 % | RESPIRATION RATE: 20 BRPM | HEART RATE: 108 BPM | DIASTOLIC BLOOD PRESSURE: 75 MMHG | TEMPERATURE: 99 F

## 2021-08-12 PROCEDURE — 99282 EMERGENCY DEPT VISIT SF MDM: CPT

## 2021-08-12 PROCEDURE — 99283 EMERGENCY DEPT VISIT LOW MDM: CPT

## 2021-08-12 NOTE — ED PROVIDER NOTE - CLINICAL SUMMARY MEDICAL DECISION MAKING FREE TEXT BOX
Rash; nonspecific.  not appearing to be dermatomal or no shingles.  pt already on steroid craem and abx.  Will add predisone and have pt f/u with dermatologist.   pt is well and nontoxic and well appearing, no fever.

## 2021-08-12 NOTE — ED PROVIDER NOTE - PATIENT PORTAL LINK FT
You can access the FollowMyHealth Patient Portal offered by [facility] by registering at the following website: [email or web address]. By joining [employer]’s FollowMyHealth portal, you will also be able to view your health information using other applications (apps) compatible with our system.

## 2021-08-12 NOTE — ED ADULT TRIAGE NOTE - DOMESTIC TRAVEL HIGH RISK QUESTION
"Subjective:       Patient ID: Ronnie Sandoval is a 62 y.o. male.    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 Occasional heaviness of legs. He denies pain, PND, orthopnea. +CHUNG that is stable. Adherent with lasix 40mg BID. He did not increase to 80mg BID as discussed at last visit. He was unable to have filter placed reportedly due to size of vessel and lack of suitable area to deploy stent. He denies CP, SOB, worsening SOB.         Review of Systems    Objective:      Vitals:    01/22/18 1250   BP: 134/78   Pulse: 94   SpO2: 98%   Weight: 85.3 kg (188 lb 0.8 oz)   Height: 5' 9" (1.753 m)      Physical Exam   Constitutional: He is oriented to person, place, and time. He appears well-developed and well-nourished. He does not have a sickly appearance. No distress.   HENT:   Head: Normocephalic and atraumatic.   Eyes: Conjunctivae and EOM are normal. Right eye exhibits no discharge. Left eye exhibits no discharge. No scleral icterus.   Pulmonary/Chest: Effort normal. No respiratory distress.   Abdominal: Normal appearance. He exhibits no distension.   Musculoskeletal: He exhibits edema (pitting bilateral to thigh).   Neurological: He is alert and oriented to person, place, and time.   Skin: Skin is warm and dry. He is not diaphoretic.   Psychiatric: He has a normal mood and affect. His speech is normal.       Assessment:       1. Bilateral leg edema    2. Acute deep vein thrombosis (DVT) of femoral vein of left lower extremity        Plan:       Ronnie was seen today for follow-up and shortness of breath.    Diagnoses and all orders for this visit:    Bilateral leg edema  -     furosemide (LASIX) 80 MG tablet; Take 1 tablet (80 mg total) by mouth 2 (two) times daily.  Acute deep vein thrombosis (DVT) of femoral vein of left lower extremity   -defer to " heme                   Side effects of medication(s) were discussed in detail and patient voiced understanding.  Patient will call back for any issues or complications.    No

## 2021-08-12 NOTE — ED PROVIDER NOTE - OBJECTIVE STATEMENT
65 y/o female with a rash to her left leg.  pt states that this has been on going for about several weeks.  pt saw a dermotologist at [facility] about a week ago.  Patient was given doxyclycline and triamcinolone cream.  pt states that after the cream, it's a little worse with itchyness.  pt with no swelling, no fever, no weeping or oozing of the rash.  rash is on left lower leg and scalley.  Patient with no cp, no sob, no headache, no neck pain, no ab pain, no travel, no recent sickness.  pt states that the rash has also been on left and right hip but stayed the same.  pt is otherwise well.   pt did not call the dermotologist.  pt is otherwise well and walked into the ED for the rash. pt works as a  outside.  no new allergens that pt recollects.

## 2021-08-12 NOTE — ED PROVIDER NOTE - SKIN, MLM
Skin normal color for race, warm, dry and intact. rash on left lower leg, macular, scalley, not warm, no weeping, no signs of infection, non tender.  rash rash on left and right hip.

## 2021-09-09 ENCOUNTER — EMERGENCY (EMERGENCY)
Facility: HOSPITAL | Age: 66
LOS: 1 days | Discharge: HOME | End: 2021-09-09
Attending: EMERGENCY MEDICINE
Payer: MEDICARE

## 2021-09-09 VITALS
RESPIRATION RATE: 18 BRPM | DIASTOLIC BLOOD PRESSURE: 70 MMHG | OXYGEN SATURATION: 99 % | HEIGHT: 62 IN | SYSTOLIC BLOOD PRESSURE: 145 MMHG | TEMPERATURE: 99 F | HEART RATE: 82 BPM

## 2021-09-09 PROCEDURE — 99283 EMERGENCY DEPT VISIT LOW MDM: CPT

## 2021-09-09 RX ORDER — HYDROXYZINE HCL 10 MG
1 TABLET ORAL
Qty: 20 | Refills: 0
Start: 2021-09-09 | End: 2021-09-18

## 2021-09-09 RX ORDER — HYDROXYZINE HCL 10 MG
25 TABLET ORAL ONCE
Refills: 0 | Status: COMPLETED | OUTPATIENT
Start: 2021-09-09 | End: 2021-09-09

## 2021-09-09 RX ORDER — FAMOTIDINE 10 MG/ML
20 INJECTION INTRAVENOUS DAILY
Refills: 0 | Status: DISCONTINUED | OUTPATIENT
Start: 2021-09-09 | End: 2021-09-13

## 2021-09-09 RX ORDER — FAMOTIDINE 10 MG/ML
1 INJECTION INTRAVENOUS
Qty: 28 | Refills: 0
Start: 2021-09-09 | End: 2021-09-22

## 2021-09-09 RX ADMIN — Medication 25 MILLIGRAM(S): at 14:50

## 2021-09-09 RX ADMIN — Medication 20 MILLIGRAM(S): at 13:52

## 2021-09-09 RX ADMIN — FAMOTIDINE 20 MILLIGRAM(S): 10 INJECTION INTRAVENOUS at 13:52

## 2021-09-09 NOTE — ED PROVIDER NOTE - ENMT, MLM
Airway patent, Nasal mucosa clear. Mouth with normal mucosa. Throat has no vesicles, no oropharyngeal exudates and uvula is midline. No lip swelling, no tongue swelling.

## 2021-09-09 NOTE — ED PROVIDER NOTE - PATIENT PORTAL LINK FT
You can access the FollowMyHealth Patient Portal offered by [facility] by registering at the following website: [email or web address]. By joining [employer]’s FollowMyHealth portal, you will also be able to view your health information using other applications (apps) compatible with our system.

## 2021-09-09 NOTE — ED PROVIDER NOTE - OBJECTIVE STATEMENT
65 y/o F presents to the ED with complaints of recurrent hives to whole body for the past month. Patient was seen 1 month ago here, given medications and felt better but symptoms of hives returned. Patient is also complaining of dry areas and dry rash to ankles, elbows and groin area. Denies tongue swelling, lip swelling, difficulty breathing, shortness of breath, wheezing or any other acute complaints. Patient works in a [employer] and is contact with [employer].

## 2021-09-09 NOTE — ED PROVIDER NOTE - CLINICAL SUMMARY MEDICAL DECISION MAKING FREE TEXT BOX
Will treat for allergic reaction with topical moisturizing lotion. Advised follow up with allergist.

## 2021-09-09 NOTE — ED ADULT TRIAGE NOTE - BP NONINVASIVE SYSTOLIC (MM HG)
Medicare Wellness Visit  Plan for Preventive Care    A good way for you to stay healthy is to use preventive care.  Medicare covers many services that can help you stay healthy.* The goal of these services is to find any health problems as quickly as possible. Finding problems early can help make them easier to treat.  Your personal plan below lists the services you may need and when they are due.     Health Maintenance Summary     Topic Due On Due Status Completed On    Colorectal Cancer Screening - Colonoscopy Jun 30, 2019 Not Due Jun 30, 2009    Immunization-Zoster Dec 9, 2004 Overdue     Immunization - Pneumococcal Oct 1, 2017 Not Due Oct 1, 2016    Abdominal Aortic Aneurysm (AAA) Screening  Dec 9, 2009 Overdue     Medicare Wellness Visit Dec 28, 2016 Overdue Dec 28, 2015    IMMUNIZATION - DTaP/Tdap/Td Jul 28, 2018 Not Due Jul 28, 2008    Immunization-Influenza  Completed Oct 1, 2016           Preventive Care for Women and Men    Heart Screenings (Cardiovascular):  · Blood tests are used to check your cholesterol, lipid and triglyceride levels. High levels can increase your risk for heart disease and stroke. High levels can be treated with medications, diet and exercise. Lowering your levels can help keep your heart and blood vessels healthy.  Your provider will order these tests if they are needed.    · An ultrasound is done to see if you have an abdominal aortic aneurysm (AAA).  This is an enlargement of one of the main blood vessels that delivers blood to the body.   In the [place], 9,000 deaths are caused by AAA.  You may not even know you have this problem and as many as 1 in 3 people will have a serious problem if it is not treated.  Early diagnosis allows for more effective treatment and cure.  If you have a family history of AAA or are a male age 65-75 who has smoked, you are at higher risk of an AAA.  Your provider can order this test, if needed.    Colorectal Screening:  · There are many tests 
that are used to check for cancer of your colon and rectum. You and your provider should discuss what test is best for you and when to have it done.  Options include:  · Screening Colonoscopy: exam of the entire colon, seen through a flexible lighted tube.  · Flexible Sigmoidoscopy: exam of the last third (sigmoid portion) of the colon and rectum, seen through a flexible lighted tube.  · Cologuard DNA stool test: a sample of your stool is used to screen for cancer and unseen blood in your stool.  · Fecal Occult Blood Test: a sample of your stool is studied to find any unseen blood    Flu Shot:  · An immunization that helps to prevent influenza (the flu). You should get this every year. The best time to get the shot is in the fall.    Pneumococcal Shot:  • Vaccines are available that can help prevent pneumococcal disease, which is any type of infection caused by Streptococcus pneumoniae bacteria.   Their use can prevent some cases of pneumonia, meningitis, and sepsis. There are two types of pneumococcal vaccines:   o Conjugate vaccines (PCV-13 or Prevnar 13®) - helps protect against the 13 types of pneumococcal bacteria that are the most common causes of serious infections in children and adults.    o Polysaccharide vaccine (PPSV23 or Tedtzismo17®) - helps protect against 23 types of pneumococcal bacteria for patients who are recommended to get it.  These vaccines should be given at least 12 months apart.  A booster is usually not needed.         Hepatitis B Shot:  · An immunization that helps to protect people from getting Hepatitis B. Hepatitis B is a virus that spreads through contact with infected blood or body fluids. Many people with the virus do not have symptoms.  The virus can lead to serious problems, such as liver disease. Some people are at higher risk than others. Your doctor will tell you if you need this shot.     Diabetes Screening:  · A test to measure sugar (glucose) in your blood is called a 
fasting blood sugar. Fasting means you cannot have food or drink for at least 8 hours before the test. This test can detect diabetes long before you may notice symptoms.    Glaucoma Screening:  · Glaucoma screening is performed by your eye doctor. The test measures the fluid pressure inside your eyes to determine if you have glaucoma.     Hepatitis C Screening:  · A blood test to see if you have the hepatitis C virus.  Hepatitis C attacks the liver and is a major cause of chronic liver disease.  Medicare will cover a single screening for all adults born between 1945 & 1965, or high risk patients (people who have injected illegal drugs or people who have had blood transfusions).  High risk patients who continue to inject illegal drugs can be screened for Hepatitis C every year.    Smoking and Tobacco-Use Cessation Counseling:  · Tobacco is the single greatest cause of disease and early death in our country today. Medication and counseling together can increase a person’s chance of quitting for good.   · Medicare covers two quitting attempts per year, with four counseling sessions per attempt (eight sessions in a 12 month period)    Preventive Screening tests for Women    Screening Mammograms and Breast Exams:  · An x-ray of your breasts to check for breast cancer before you or your doctor may be able to feel it.  If breast cancer is found early it can usually be treated with success.    Pelvic Exams and Pap Tests:  · An exam to check for cervical and vaginal cancer. A Pap test is a lab test in which cells are taken from your cervix and sent to the lab to look for signs of cervical cancer. If cancer of the cervix is found early, chances for a cure are good. Testing can generally end at age 65, or if a woman has a hysterectomy for a benign condition. Your provider may recommend more frequent testing if certain abnormal results are found.    Bone Mass Measurements:  · A painless x-ray of your bone density to see if you 
are at risk for a broken bone. Bone density refers to the thickness of bones or how tightly the bone tissue is packed.    Preventive Screening tests for Men    Prostate Screening:  · PSA - Prostate Cancer blood test.  Experts do not recommend routine screening of healthy men with no signs or symptoms of prostate disease.  However, men should not ignore urinary symptoms, and should discuss their family history with their doctor.    *Medicare pays for many preventive services to keep you healthy. For some of these services, you might have to pay a deductible, coinsurance, and / or copayment.  The amounts vary depending on the type of services you need and the kind of Medicare health plan you have.              
145
- - -

## 2022-05-05 NOTE — ED ADULT TRIAGE NOTE - HEIGHT IN FEET
5 Priority: normal Subcutaneous Lesion Ultrasound Reason: Irregular in shape Other Ultrasound Protocol Reason: Evaluation of vessels Time Frame Unit: month(s) Detail Level: Detailed Neck Ultrasound Reason: Enlarged thyroid Liver Ultrasound Reason: Liver shearwave Elastography Other Ultrasound Protocol Name: Legs Ultrasound Protocol: Ultrasound of Subcutaneous Mass Provider: Mone Luna MD

## 2022-10-31 ENCOUNTER — APPOINTMENT (OUTPATIENT)
Dept: RADIOLOGY | Facility: HOSPITAL | Age: 67
End: 2022-10-31

## 2022-10-31 ENCOUNTER — OUTPATIENT (OUTPATIENT)
Dept: OUTPATIENT SERVICES | Facility: HOSPITAL | Age: 67
LOS: 1 days | End: 2022-10-31
Payer: MEDICARE

## 2022-10-31 DIAGNOSIS — R13.10 DYSPHAGIA, UNSPECIFIED: ICD-10-CM

## 2022-10-31 PROCEDURE — 74220 X-RAY XM ESOPHAGUS 1CNTRST: CPT | Mod: 26

## 2022-10-31 PROCEDURE — 74220 X-RAY XM ESOPHAGUS 1CNTRST: CPT

## 2023-03-23 ENCOUNTER — APPOINTMENT (OUTPATIENT)
Dept: GASTROENTEROLOGY | Facility: CLINIC | Age: 68
End: 2023-03-23
Payer: MEDICARE

## 2023-03-23 VITALS
HEART RATE: 95 BPM | HEIGHT: 62 IN | WEIGHT: 120 LBS | DIASTOLIC BLOOD PRESSURE: 76 MMHG | SYSTOLIC BLOOD PRESSURE: 127 MMHG | BODY MASS INDEX: 22.08 KG/M2 | OXYGEN SATURATION: 100 %

## 2023-03-23 DIAGNOSIS — K76.89 OTHER SPECIFIED DISEASES OF LIVER: ICD-10-CM

## 2023-03-23 DIAGNOSIS — R93.89 ABNORMAL FINDINGS ON DIAGNOSTIC IMAGING OF OTHER SPECIFIED BODY STRUCTURES: ICD-10-CM

## 2023-03-23 DIAGNOSIS — K31.A0 GASTRIC INTESTINAL METAPLASIA, UNSPECIFIED: ICD-10-CM

## 2023-03-23 DIAGNOSIS — K21.9 GASTRO-ESOPHAGEAL REFLUX DISEASE W/OUT ESOPHAGITIS: ICD-10-CM

## 2023-03-23 DIAGNOSIS — R19.5 OTHER FECAL ABNORMALITIES: ICD-10-CM

## 2023-03-23 DIAGNOSIS — Z12.11 ENCOUNTER FOR SCREENING FOR MALIGNANT NEOPLASM OF COLON: ICD-10-CM

## 2023-03-23 PROCEDURE — 99203 OFFICE O/P NEW LOW 30 MIN: CPT

## 2023-03-23 RX ORDER — POLYETHYLENE GLYCOL 3350, SODIUM SULFATE, SODIUM CHLORIDE, POTASSIUM CHLORIDE, ASCORBIC ACID, SODIUM ASCORBATE 140-9-5.2G
140 KIT ORAL
Qty: 1 | Refills: 0 | Status: ACTIVE | COMMUNITY
Start: 2023-03-23 | End: 1900-01-01

## 2023-03-23 RX ORDER — OMEPRAZOLE 40 MG/1
40 CAPSULE, DELAYED RELEASE ORAL
Qty: 30 | Refills: 3 | Status: ACTIVE | COMMUNITY
Start: 2023-03-23 | End: 1900-01-01

## 2023-03-23 NOTE — HISTORY OF PRESENT ILLNESS
[FreeTextEntry1] : This is a 68 year old female here for an evaluation of reflux, gastric intestinal metaplasia and occult positive. PMH katherine and reflux. Denies a family history of colon CA, gastric CA, high risk polyps, Inflammatory and autoimmune disease. Patient denies any difficulty swallowing. Has worsening reflux and is on Omeprazole 40mg daily taking after meals. No n&V or abdominal pain. Had an EGD on 7/8/21 which revealed gastris, small hiatal hernia. Pathology showed gastric metaplasia and negative H-pylori. She denies any blood or dark tarry stools. Normal caliber. Down 10 pounds in 12 months. Never had a colonoscopy. Abdominal sono from 1/17/23 with 3.2cm liver cyst. Labs from 3/9/23 HH 12.7/38.5 MCV 90  ASt 36 ALT 33 ALK 76. Stool occult from 1/1723. \par Smoke/Etoh: none

## 2023-03-23 NOTE — PHYSICAL EXAM
[Alert] : alert [Normal Voice/Communication] : normal voice/communication [Healthy Appearing] : healthy appearing [No Acute Distress] : no acute distress [Sclera] : the sclera and conjunctiva were normal [Hearing Threshold Finger Rub Not [place]] : hearing was normal [Normal Lips/Gums] : the lips and gums were normal [Oropharynx] : the oropharynx was normal [Normal Appearance] : the appearance of the neck was normal [No Neck Mass] : no neck mass was observed [No Respiratory Distress] : no respiratory distress [No Acc Muscle Use] : no accessory muscle use [Respiration, Rhythm And Depth] : normal respiratory rhythm and effort [Auscultation Breath Sounds / Voice Sounds] : lungs were clear to auscultation bilaterally [Heart Rate And Rhythm] : heart rate was normal and rhythm regular [Normal S1, S2] : normal S1 and S2 [Murmurs] : no murmurs [Bowel Sounds] : normal bowel sounds [Abdomen Tenderness] : non-tender [No Masses] : no abdominal mass palpated [Abdomen Soft] : soft [] : no hepatosplenomegaly [Oriented To Time, Place, And Person] : oriented to person, place, and time

## 2023-03-23 NOTE — ASSESSMENT
[FreeTextEntry1] : This is a 68 year old female here for an evaluation of reflux, gastric intestinal metaplasia and occult positive\par \par My plan \par Take Omeprazole 30-45min before breakfast\par EGD to reassess gastric metaplasia\par colonoscopy \par Plenvu\par MRI to assess liver cyst \par \par Risks, benefits, and alternatives of EGD and colonoscopy discussed at length with patient including but not limited to bleeding , perforation, anesthesia related complication, aspiration, etc. Patient expressed understanding.\par \par EGD/Colonoscopy for evaluation of polyps, tumors, nodules with +/- biopsy and or cauterization w/ and or w/o clipping. \par

## 2023-03-23 NOTE — REASON FOR VISIT
[Initial Evaluation] : an initial evaluation [Interpreters_IDNumber] : [id number] [Interpreters_FullName] : Nichole [FreeTextEntry3] : [place]

## 2023-03-30 ENCOUNTER — OUTPATIENT (OUTPATIENT)
Dept: OUTPATIENT SERVICES | Facility: HOSPITAL | Age: 68
LOS: 1 days | End: 2023-03-30
Payer: MEDICARE

## 2023-03-30 ENCOUNTER — APPOINTMENT (OUTPATIENT)
Dept: MRI IMAGING | Facility: HOSPITAL | Age: 68
End: 2023-03-30
Payer: MEDICARE

## 2023-03-30 DIAGNOSIS — R93.89 ABNORMAL FINDINGS ON DIAGNOSTIC IMAGING OF OTHER SPECIFIED BODY STRUCTURES: ICD-10-CM

## 2023-03-30 DIAGNOSIS — K76.89 OTHER SPECIFIED DISEASES OF LIVER: ICD-10-CM

## 2023-03-30 PROCEDURE — 74183 MRI ABD W/O CNTR FLWD CNTR: CPT

## 2023-03-30 PROCEDURE — A9585: CPT

## 2023-03-30 PROCEDURE — 74183 MRI ABD W/O CNTR FLWD CNTR: CPT | Mod: 26

## 2023-04-25 RX ORDER — POLYETHYLENE GLYCOL-3350 AND ELECTROLYTES WITH FLAVOR PACK 240; 5.84; 2.98; 6.72; 22.72 G/278.26G; G/278.26G; G/278.26G; G/278.26G; G/278.26G
240 POWDER, FOR SOLUTION ORAL
Qty: 1 | Refills: 0 | Status: ACTIVE | COMMUNITY
Start: 2023-04-25 | End: 1900-01-01

## 2023-05-03 ENCOUNTER — RESULT REVIEW (OUTPATIENT)
Age: 68
End: 2023-05-03

## 2023-05-03 ENCOUNTER — TRANSCRIPTION ENCOUNTER (OUTPATIENT)
Age: 68
End: 2023-05-03

## 2023-05-03 ENCOUNTER — APPOINTMENT (OUTPATIENT)
Dept: GASTROENTEROLOGY | Facility: HOSPITAL | Age: 68
End: 2023-05-03

## 2023-05-03 ENCOUNTER — OUTPATIENT (OUTPATIENT)
Dept: OUTPATIENT SERVICES | Facility: HOSPITAL | Age: 68
LOS: 1 days | End: 2023-05-03
Payer: MEDICARE

## 2023-05-03 VITALS
SYSTOLIC BLOOD PRESSURE: 110 MMHG | OXYGEN SATURATION: 98 % | HEART RATE: 98 BPM | DIASTOLIC BLOOD PRESSURE: 63 MMHG | RESPIRATION RATE: 17 BRPM

## 2023-05-03 VITALS
TEMPERATURE: 98 F | HEART RATE: 99 BPM | RESPIRATION RATE: 17 BRPM | HEIGHT: 62 IN | WEIGHT: 121.92 LBS | OXYGEN SATURATION: 98 % | SYSTOLIC BLOOD PRESSURE: 122 MMHG | DIASTOLIC BLOOD PRESSURE: 69 MMHG

## 2023-05-03 DIAGNOSIS — Z90.49 ACQUIRED ABSENCE OF OTHER SPECIFIED PARTS OF DIGESTIVE TRACT: Chronic | ICD-10-CM

## 2023-05-03 DIAGNOSIS — K21.9 GASTRO-ESOPHAGEAL REFLUX DISEASE WITHOUT ESOPHAGITIS: ICD-10-CM

## 2023-05-03 DIAGNOSIS — R19.5 OTHER FECAL ABNORMALITIES: ICD-10-CM

## 2023-05-03 DIAGNOSIS — Z12.11 ENCOUNTER FOR SCREENING FOR MALIGNANT NEOPLASM OF COLON: ICD-10-CM

## 2023-05-03 PROCEDURE — 88312 SPECIAL STAINS GROUP 1: CPT | Mod: 26

## 2023-05-03 PROCEDURE — 43239 EGD BIOPSY SINGLE/MULTIPLE: CPT

## 2023-05-03 PROCEDURE — 88312 SPECIAL STAINS GROUP 1: CPT

## 2023-05-03 PROCEDURE — 88305 TISSUE EXAM BY PATHOLOGIST: CPT | Mod: 26

## 2023-05-03 PROCEDURE — 88305 TISSUE EXAM BY PATHOLOGIST: CPT

## 2023-05-03 PROCEDURE — 45378 DIAGNOSTIC COLONOSCOPY: CPT

## 2023-05-03 NOTE — ASU DISCHARGE PLAN (ADULT/PEDIATRIC) - MODE OF TRANSPORTATION
Ambulatory Oral Minoxidil Pregnancy And Lactation Text: This medication should only be used when clearly needed if you are pregnant, attempting to become pregnant or breast feeding.

## 2023-05-03 NOTE — ASU PATIENT PROFILE, ADULT - FALL HARM RISK - UNIVERSAL INTERVENTIONS
Bed in lowest position, wheels locked, appropriate side rails in place/Call bell, personal items and telephone in reach/Instruct patient to call for assistance before getting out of bed or chair/Non-slip footwear when patient is out of bed/[place] to call system/Physically safe environment - no spills, clutter or unnecessary equipment/Purposeful Proactive Rounding/Room/bathroom lighting operational, light cord in reach

## 2023-05-03 NOTE — ASU DISCHARGE PLAN (ADULT/PEDIATRIC) - NS MD DC FALL RISK RISK
For information on Fall & Injury Prevention, visit: [email or web address] OR  [email or web address] OR  https://www.cdc.gov/steadi/patient.html

## 2023-05-03 NOTE — ASU PATIENT PROFILE, ADULT - PATIENT'S HEIGHT AND WEIGHT RECORDED IN THE VITAL SIGNS FLOWSHEET
Called and spoke to Radha     I made Radha an appt with Dr. Sapp for 3/3 @ 230 pm. Mailed out a new pt packet with time, and date. Radha seemed unsure of the address.     Arely rFank Communication Facilitator on 1/24/2022 at 1:14 PM     yes

## 2023-05-05 LAB — SURGICAL PATHOLOGY STUDY: SIGNIFICANT CHANGE UP

## 2023-05-10 ENCOUNTER — NON-APPOINTMENT (OUTPATIENT)
Age: 68
End: 2023-05-10

## 2023-06-12 ENCOUNTER — EMERGENCY (EMERGENCY)
Facility: HOSPITAL | Age: 68
LOS: 1 days | Discharge: HOME | End: 2023-06-12
Attending: STUDENT IN AN ORGANIZED HEALTH CARE EDUCATION/TRAINING PROGRAM
Payer: MEDICARE

## 2023-06-12 VITALS
DIASTOLIC BLOOD PRESSURE: 78 MMHG | OXYGEN SATURATION: 98 % | RESPIRATION RATE: 19 BRPM | TEMPERATURE: 99 F | SYSTOLIC BLOOD PRESSURE: 119 MMHG | HEART RATE: 81 BPM

## 2023-06-12 VITALS
DIASTOLIC BLOOD PRESSURE: 76 MMHG | OXYGEN SATURATION: 97 % | TEMPERATURE: 99 F | HEIGHT: 62 IN | RESPIRATION RATE: 18 BRPM | HEART RATE: 82 BPM | SYSTOLIC BLOOD PRESSURE: 125 MMHG | WEIGHT: 121.03 LBS

## 2023-06-12 DIAGNOSIS — Z90.49 ACQUIRED ABSENCE OF OTHER SPECIFIED PARTS OF DIGESTIVE TRACT: Chronic | ICD-10-CM

## 2023-06-12 LAB
ALBUMIN SERPL ELPH-MCNC: 4 G/DL — SIGNIFICANT CHANGE UP (ref 3.5–5)
ALP SERPL-CCNC: 70 U/L — SIGNIFICANT CHANGE UP (ref 40–120)
ALT FLD-CCNC: 60 U/L DA — SIGNIFICANT CHANGE UP (ref 10–60)
ANION GAP SERPL CALC-SCNC: 4 MMOL/L — LOW (ref 5–17)
APPEARANCE UR: CLEAR — SIGNIFICANT CHANGE UP
APTT BLD: 34.2 SEC — SIGNIFICANT CHANGE UP (ref 27.5–35.5)
AST SERPL-CCNC: 42 U/L — HIGH (ref 10–40)
BACTERIA # UR AUTO: ABNORMAL /HPF
BASOPHILS # BLD AUTO: 0.03 K/UL — SIGNIFICANT CHANGE UP (ref 0–0.2)
BASOPHILS NFR BLD AUTO: 0.5 % — SIGNIFICANT CHANGE UP (ref 0–2)
BILIRUB SERPL-MCNC: 0.9 MG/DL — SIGNIFICANT CHANGE UP (ref 0.2–1.2)
BILIRUB UR-MCNC: NEGATIVE — SIGNIFICANT CHANGE UP
BUN SERPL-MCNC: 13 MG/DL — SIGNIFICANT CHANGE UP (ref 7–18)
CALCIUM SERPL-MCNC: 9.4 MG/DL — SIGNIFICANT CHANGE UP (ref 8.4–10.5)
CHLORIDE SERPL-SCNC: 110 MMOL/L — HIGH (ref 96–108)
CO2 SERPL-SCNC: 25 MMOL/L — SIGNIFICANT CHANGE UP (ref 22–31)
COLOR SPEC: YELLOW — SIGNIFICANT CHANGE UP
COMMENT - URINE: SIGNIFICANT CHANGE UP
CREAT SERPL-MCNC: 0.46 MG/DL — LOW (ref 0.5–1.3)
DIFF PNL FLD: ABNORMAL
EGFR: 103 ML/MIN/1.73M2 — SIGNIFICANT CHANGE UP
EOSINOPHIL # BLD AUTO: 0.15 K/UL — SIGNIFICANT CHANGE UP (ref 0–0.5)
EOSINOPHIL NFR BLD AUTO: 2.5 % — SIGNIFICANT CHANGE UP (ref 0–6)
EPI CELLS # UR: ABNORMAL /HPF
GLUCOSE SERPL-MCNC: 90 MG/DL — SIGNIFICANT CHANGE UP (ref 70–99)
GLUCOSE UR QL: NEGATIVE — SIGNIFICANT CHANGE UP
HCT VFR BLD CALC: 40.9 % — SIGNIFICANT CHANGE UP (ref 34.5–45)
HGB BLD-MCNC: 13.1 G/DL — SIGNIFICANT CHANGE UP (ref 11.5–15.5)
IMM GRANULOCYTES NFR BLD AUTO: 0 % — SIGNIFICANT CHANGE UP (ref 0–0.9)
INR BLD: 1 RATIO — SIGNIFICANT CHANGE UP (ref 0.88–1.16)
KETONES UR-MCNC: ABNORMAL
LEUKOCYTE ESTERASE UR-ACNC: NEGATIVE — SIGNIFICANT CHANGE UP
LYMPHOCYTES # BLD AUTO: 1.36 K/UL — SIGNIFICANT CHANGE UP (ref 1–3.3)
LYMPHOCYTES # BLD AUTO: 22.7 % — SIGNIFICANT CHANGE UP (ref 13–44)
MCHC RBC-ENTMCNC: 29.4 PG — SIGNIFICANT CHANGE UP (ref 27–34)
MCHC RBC-ENTMCNC: 32 GM/DL — SIGNIFICANT CHANGE UP (ref 32–36)
MCV RBC AUTO: 91.7 FL — SIGNIFICANT CHANGE UP (ref 80–100)
MONOCYTES # BLD AUTO: 0.43 K/UL — SIGNIFICANT CHANGE UP (ref 0–0.9)
MONOCYTES NFR BLD AUTO: 7.2 % — SIGNIFICANT CHANGE UP (ref 2–14)
NEUTROPHILS # BLD AUTO: 4.03 K/UL — SIGNIFICANT CHANGE UP (ref 1.8–7.4)
NEUTROPHILS NFR BLD AUTO: 67.1 % — SIGNIFICANT CHANGE UP (ref 43–77)
NITRITE UR-MCNC: NEGATIVE — SIGNIFICANT CHANGE UP
NRBC # BLD: 0 /100 WBCS — SIGNIFICANT CHANGE UP (ref 0–0)
PH UR: 7 — SIGNIFICANT CHANGE UP (ref 5–8)
PLATELET # BLD AUTO: 186 K/UL — SIGNIFICANT CHANGE UP (ref 150–400)
POTASSIUM SERPL-MCNC: 3.9 MMOL/L — SIGNIFICANT CHANGE UP (ref 3.5–5.3)
POTASSIUM SERPL-SCNC: 3.9 MMOL/L — SIGNIFICANT CHANGE UP (ref 3.5–5.3)
PROT SERPL-MCNC: 7.8 G/DL — SIGNIFICANT CHANGE UP (ref 6–8.3)
PROT UR-MCNC: 15 MG/DL
PROTHROM AB SERPL-ACNC: 11.9 SEC — SIGNIFICANT CHANGE UP (ref 10.5–13.4)
RBC # BLD: 4.46 M/UL — SIGNIFICANT CHANGE UP (ref 3.8–5.2)
RBC # FLD: 13.7 % — SIGNIFICANT CHANGE UP (ref 10.3–14.5)
RBC CASTS # UR COMP ASSIST: ABNORMAL /HPF (ref 0–2)
SODIUM SERPL-SCNC: 139 MMOL/L — SIGNIFICANT CHANGE UP (ref 135–145)
SP GR SPEC: 1.01 — SIGNIFICANT CHANGE UP (ref 1.01–1.02)
TROPONIN I, HIGH SENSITIVITY RESULT: 7.3 NG/L — SIGNIFICANT CHANGE UP
UROBILINOGEN FLD QL: NEGATIVE — SIGNIFICANT CHANGE UP
WBC # BLD: 6 K/UL — SIGNIFICANT CHANGE UP (ref 3.8–10.5)
WBC # FLD AUTO: 6 K/UL — SIGNIFICANT CHANGE UP (ref 3.8–10.5)
WBC UR QL: SIGNIFICANT CHANGE UP /HPF (ref 0–5)

## 2023-06-12 PROCEDURE — 81001 URINALYSIS AUTO W/SCOPE: CPT

## 2023-06-12 PROCEDURE — 82962 GLUCOSE BLOOD TEST: CPT

## 2023-06-12 PROCEDURE — 80053 COMPREHEN METABOLIC PANEL: CPT

## 2023-06-12 PROCEDURE — 36415 COLL VENOUS BLD VENIPUNCTURE: CPT

## 2023-06-12 PROCEDURE — 99283 EMERGENCY DEPT VISIT LOW MDM: CPT

## 2023-06-12 PROCEDURE — 87086 URINE CULTURE/COLONY COUNT: CPT

## 2023-06-12 PROCEDURE — 93010 ELECTROCARDIOGRAM REPORT: CPT

## 2023-06-12 PROCEDURE — 84484 ASSAY OF TROPONIN QUANT: CPT

## 2023-06-12 PROCEDURE — 85610 PROTHROMBIN TIME: CPT

## 2023-06-12 PROCEDURE — 85025 COMPLETE CBC W/AUTO DIFF WBC: CPT

## 2023-06-12 PROCEDURE — 85730 THROMBOPLASTIN TIME PARTIAL: CPT

## 2023-06-12 PROCEDURE — 93005 ELECTROCARDIOGRAM TRACING: CPT

## 2023-06-12 PROCEDURE — 99285 EMERGENCY DEPT VISIT HI MDM: CPT

## 2023-06-12 RX ORDER — MECLIZINE HCL 12.5 MG
25 TABLET ORAL ONCE
Refills: 0 | Status: COMPLETED | OUTPATIENT
Start: 2023-06-12 | End: 2023-06-12

## 2023-06-12 RX ORDER — MECLIZINE HCL 12.5 MG
1 TABLET ORAL
Qty: 15 | Refills: 0
Start: 2023-06-12 | End: 2023-06-16

## 2023-06-12 RX ADMIN — Medication 25 MILLIGRAM(S): at 15:40

## 2023-06-12 NOTE — ED PROVIDER NOTE - NSFOLLOWUPINSTRUCTIONS_ED_ALL_ED_FT
Lo vieron en el departamento de emergencias por: mareos  Edmonds diagnóstico para esta visita fue: vértigo  De esta visita al servicio de urgencias se le recetó: meclizina según sea necesario para los mareos  Se adjunta edmonds informe de resultados.  Le recomendamos que power un seguimiento con: edmonds médico de atención primaria.    Regrese al Departamento de Emergencias si experimenta alguno de los siguientes síntomas:  - Dificultad para respirar o dificultad para respirar  - Presión, dolor u opresión en el pecho  - Uma caída, debilidad en los brazos o dificultad para hablar  - Persistencia de vómitos severos  - Lesión en la owen o pérdida del conocimiento  - Sangrado continuo o felicity herida abierta    (1) Power un seguimiento con edmonds médico de atención primaria dentro de las próximas 24 a 48 horas, según lo discutido. Además, no encontramos evidencia de felicity enfermedad potencialmente mortal en joe pruebas aquí hoy, bharath a continuación se enumeran los especialistas que será necesario teetee kaden paciente ambulatorio para continuar con el estudio. Llame a los números que se indican a continuación o al [phone] para programar las citas necesarias.  (2) [place] Tylenol (hasta 1000 mg o 1 g) y/o Motrin (hasta 600 mg) hasta cada 6 horas según sea necesario para el dolor.  (3) Si le colocaron felicity línea IV (intravenosa), se la quitaron. A veces, después de la extracción de la vía intravenosa, jorge área puede estar sensible erick unos días; si desarrolla enrojecimiento e hinchazón, podrían ser signos de infección; en cuyo amber, regrese al Departamento de Emergencias para edmonds evaluación.  (4) Continúe tomando todos joe medicamentos en el hogar según las indicaciones.      You were seen in the emergency department for: dizziness  Your diagnosis for this visit was: vertigo  From this ED visit you were prescribed: meclizine as needed for dizziness  Your results report is attached.   We recommend you follow up with: your primary care doctor.    Please return to the Emergency Department if you experience any of the following symptoms:   - Shortness of breath or trouble breathing  - Pressure, pain or tightness in the chest  - Face drooping, arm weakness or speech difficulty  - Persistence of severe vomiting  - Head injury or loss of consciousness  - Nonstop bleeding or an open wound    (1) Follow up with your primary care physician within the next 24-48 hours as discussed. In addition, we did not find evidence of a life threatening illness on your testing here today, but listed below are the specialists that will be necessary to see as an outpatient to continue the workup.  Please call the numbers listed below or [phone] to set up the necessary appointments.  (2) Take Tylenol (up to 1000mg or 1 g)  and/or Motrin (up to 600mg) up to every 6 hours as needed for pain.   (3) If you had an IV (intravenous) line placed, it was removed. Sometimes, after IV removal, that area can be tender for a few days; if it develops redness and swelling, those could be signs of infection; in which case, return to the Emergency Department for assessment.  (4) Please continue taking all of your home medications as directed.

## 2023-06-12 NOTE — ED PROVIDER NOTE - PATIENT PORTAL LINK FT
You can access the FollowMyHealth Patient Portal offered by [facility] by registering at the following website: [email or web address]. By joining [facility]’s FollowMyHealth portal, you will also be able to view your health information using other applications (apps) compatible with our system.

## 2023-06-12 NOTE — ED PROVIDER NOTE - OBJECTIVE STATEMENT
68-year-old female no medical hx presenting with intermittent dizziness since last night. Notes she got into an argument with a coworker yesterday, has been stressed from that. Since last night every time she moves or walks she feels room spinning sensation. No cough, runny nose, congestion, chest pain, SOB, or any other concerning symptoms.

## 2023-06-12 NOTE — ED ADULT NURSE NOTE - NSFALLUNIVINTERV_ED_ALL_ED
Bed/Stretcher in lowest position, wheels locked, appropriate side rails in place/Call bell, personal items and telephone in reach/Instruct patient to call for assistance before getting out of bed/chair/stretcher/Non-slip footwear applied when patient is off stretcher/[place] to call system/Physically safe environment - no spills, clutter or unnecessary equipment/Purposeful proactive rounding/Room/bathroom lighting operational, light cord in reach

## 2023-06-12 NOTE — ED PROVIDER NOTE - CLINICAL SUMMARY MEDICAL DECISION MAKING FREE TEXT BOX
68-year-old female no medical hx presenting with intermittent dizziness since last night. Symptoms consistent with vertigo. Labs and ECG. Meclizine provided. WIll discharge with rx for meclizine.

## 2023-06-13 LAB
CULTURE RESULTS: SIGNIFICANT CHANGE UP
SPECIMEN SOURCE: SIGNIFICANT CHANGE UP

## 2023-09-03 ENCOUNTER — EMERGENCY (EMERGENCY)
Facility: HOSPITAL | Age: 68
LOS: 1 days | Discharge: HOME | End: 2023-09-03
Attending: EMERGENCY MEDICINE
Payer: MEDICARE

## 2023-09-03 VITALS
HEART RATE: 83 BPM | SYSTOLIC BLOOD PRESSURE: 147 MMHG | WEIGHT: 121.03 LBS | TEMPERATURE: 99 F | RESPIRATION RATE: 18 BRPM | DIASTOLIC BLOOD PRESSURE: 71 MMHG | OXYGEN SATURATION: 95 % | HEIGHT: 62 IN

## 2023-09-03 VITALS — RESPIRATION RATE: 18 BRPM | OXYGEN SATURATION: 95 %

## 2023-09-03 DIAGNOSIS — Z90.49 ACQUIRED ABSENCE OF OTHER SPECIFIED PARTS OF DIGESTIVE TRACT: Chronic | ICD-10-CM

## 2023-09-03 LAB
HPIV3 RNA SPEC QL NAA+PROBE: DETECTED
RAPID RVP RESULT: DETECTED
SARS-COV-2 RNA SPEC QL NAA+PROBE: SIGNIFICANT CHANGE UP

## 2023-09-03 PROCEDURE — 94640 AIRWAY INHALATION TREATMENT: CPT

## 2023-09-03 PROCEDURE — 71046 X-RAY EXAM CHEST 2 VIEWS: CPT | Mod: 26

## 2023-09-03 PROCEDURE — 99285 EMERGENCY DEPT VISIT HI MDM: CPT

## 2023-09-03 PROCEDURE — 71046 X-RAY EXAM CHEST 2 VIEWS: CPT

## 2023-09-03 PROCEDURE — 0225U NFCT DS DNA&RNA 21 SARSCOV2: CPT

## 2023-09-03 PROCEDURE — 99285 EMERGENCY DEPT VISIT HI MDM: CPT | Mod: 25

## 2023-09-03 RX ORDER — ALBUTEROL 90 UG/1
2 AEROSOL, METERED ORAL
Qty: 1 | Refills: 0
Start: 2023-09-03 | End: 2023-09-07

## 2023-09-03 RX ORDER — ALBUTEROL 90 UG/1
2.5 AEROSOL, METERED ORAL
Refills: 0 | Status: COMPLETED | OUTPATIENT
Start: 2023-09-03 | End: 2023-09-03

## 2023-09-03 RX ADMIN — Medication 20 MILLIGRAM(S): at 06:29

## 2023-09-03 RX ADMIN — ALBUTEROL 2.5 MILLIGRAM(S): 90 AEROSOL, METERED ORAL at 06:28

## 2023-09-03 RX ADMIN — ALBUTEROL 2.5 MILLIGRAM(S): 90 AEROSOL, METERED ORAL at 06:34

## 2023-09-03 RX ADMIN — ALBUTEROL 2.5 MILLIGRAM(S): 90 AEROSOL, METERED ORAL at 06:41

## 2023-09-03 NOTE — ED PROVIDER NOTE - PROGRESS NOTE DETAILS
April Moyer DO: Patient received at sign out. Pending tests/tentative plan: reassessment after treatments  Feeling better. Send the meds to the pharm. Return precautions are discussed.     PTis Czech speaking - family at bedside translating. Defers .

## 2023-09-03 NOTE — ED PROVIDER NOTE - NSFOLLOWUPINSTRUCTIONS_ED_ALL_ED_FT
You were seen for chest tightness. This is likely due to viral illness. You may use the inhaler for the symptoms as needed. Take prednisone daily.     If you start developing fever, chills, chest pain, shortness of breath or any other concerning symptoms please seek medical assistance.     No signs of emergency medical condition on today's workup.  Your results are attached with your discharge instructions, please review them with your primary care physician. If there is a result pending, you will receive a call if test is positive.    A presumptive diagnosis is made today, but further evaluation may be required by your primary care doctor and/or specialist for a definitive diagnosis. Therefore, follow up as directed and if symptoms change/worsen or any emergency conditions, please return to the ER.    For pain or fever you can ibuprofen (motrin, advil) or tylenol as needed, as directed on packaging.  You can use 500-1000mg Tylenol every 6 hours for pain - as needed.  This is an over-the-counter medications - please respect the warnings on the label. This medication come with certain risks and side effects that you need to discuss with your doctor, especially if you are taking it for a prolonged period.    If needed, call patient access services at [phone] to find a primary care doctor, or call at [phone] to make an appointment at the clinic.

## 2023-09-03 NOTE — ED PROVIDER NOTE - PHYSICAL EXAMINATION
Exam:  General: Patient well appearing, vital signs within normal limits  HEENT: airway patent with moist mucous membranes  Cardiac: RRR S1/S2 with strong peripheral pulses  Respiratory: dry cough appreciated, slight expiratory rales diffusely  GI: abdomen soft, non tender, non distended  Neuro: no gross neurologic deficits  Skin: warm, well perfused  Psych: normal mood and affect Exam:  General: Patient well appearing, vital signs within normal limits  HEENT: airway patent with moist mucous membranes  Cardiac: RRR S1/S2 with strong peripheral pulses  Respiratory: dry cough appreciated, slight expiratory rales/wheezes diffusely  GI: abdomen soft, non tender, non distended  Neuro: no gross neurologic deficits  Skin: warm, well perfused  Psych: normal mood and affect

## 2023-09-03 NOTE — ED PROVIDER NOTE - PATIENT PORTAL LINK FT
You can access the FollowMyHealth Patient Portal offered by [facility] by registering at the following website: [email or web address]. By joining [facility]’s FollowMyHealth portal, you will also be able to view your health information using other applications (apps) compatible with our system.

## 2023-09-03 NOTE — ED PROVIDER NOTE - CLINICAL SUMMARY MEDICAL DECISION MAKING FREE TEXT BOX
Patient presenting with acute cough and dyspnea.  Does not appear to be in any respiratory distress.  Suspect possible infectious bronchitis given family member with similar but lesser symptoms. Will obtain screening chest x-ray, RVP treat with nebulizers and steroids and reevaluate. Patient presenting with acute cough and dyspnea.  Does not appear to be in any respiratory distress.  Suspect possible infectious bronchitis given family member with similar but lesser symptoms. Will obtain screening chest x-ray, RVP treat with nebulizers and steroids and reevaluate.    My independent interpretations of the EKG and X rays are documented in the results section above.

## 2023-09-03 NOTE — ED PROVIDER NOTE - OBJECTIVE STATEMENT
68-year-old woman presenting complaining of 4 days of progressively worsening dry cough.  Family member who is presenting with her says he had a more mild cough and some slight scratchiness of the throat before she started having symptoms but his symptoms were never severe.  She has no prior history of smoking, and has no known lung issues.  No associated fevers.  She reports she gets episodes of persisting coughing where she feels like she has difficulty breathing.

## 2024-02-05 NOTE — ED ADULT TRIAGE NOTE - ARRIVAL FROM
[facility] Hospitalists Progress Note       Date Of Service: 02/05/24    Subjective:   Pt is sitting up in bed and continues to have flat affect and verbalizes only yes no. I directed the patient and assisted her in trying to move her arm and legs but she has minimal contraction against resistance in the legs and has bilateral proximal arm weakness. She is able to move her forearms/wrists/hands but not overhead. Discussion with son about the need for Pt to see multiple specialists after discharge including hematology, neurology, rheumatology, Urology. I explained each of the diagnosis to him that require specialists   Medical Decision Making:   Disposition:  Patient with multiple morbidities, continue with hospitalization.    Discussion and reviews:  Notes from last 24hrs reviewed and appreciated   Results of lab work and imaging studies from last 24hrs independently reviewed  Work up to continue as per orders and plan of care  Plan of care discussed with consultants as documented.    Today's updates and plan of care changes:  2/5:  --Swallowing eval attempted yesterday but due to lethargy Pt had difficulty following directions. SLP Recommended Thin Liquid and soft/bite size diet with feeding support  ongoing ST training  --Cultures NG from 2/2   --ESR 92 Complement low 60   --Suspect SLE  --Rheumatology to initiate steroids  --Continue IV fluids due to poor oral intake    Today's updates and plan of care changes:  2/4  - S/p 1 unit PRBCs - Hgb improved to 8.3  - S/p Neupogen x 1 - WBCs 9.1 today   - Cont D5NS (changed due to persistent hyperkalemia and hypoglycemia with decreased PO intake)  - Expect K to improve as pt finished Bactrim - one more lokelma and monitor  - Holding ASA/Heparin  - Rheum workup ongoing, recommends EMG as outpatient  - Speech eval pending  - Occult blood needs to be collected       2/3  - Tmax 100.8 this AM, leukopenia persists s/p Neupogen x 1 by Heme  - Lokelma being given for hyperkalemia  -  Worsening Cr, touched base with Nephrology - start IVF, possibly from bactrim  - Repeat UA without infection, UPCr improved but still elevated  - CXR 2/2 - No interval change, suboptimal inspiration  - Continue espinosa and finish PO Bactrim    - Rheumatology consulted, Dr. Dia to see pt today  - Neuro, Dr. Gilliam, re-consulted - No further neurological testing recommended. Mentation likely multifactorial metabolic encephalopathy due to non-neurologic causes.   - Speech consulted  - Hgb 6.5 this afternoon - 1 unit PRBC given  - Ordered occult blood  - Holding ASA and heparin  2/2:  --Pt febrile with temp of 101.3 early AM which was sometime after the espinosa was --removed  --Pt with bladder scan showing 300 after espinosa D/cd.  --ID recommends replacing Espinosa and continuing Bactrim  --Urine culture from prior to espinosa 1/28 ecoli-repeat urine sent  --Heme work-up still in progress  --Potassium elevated   --ULYSSES positive 1:1280 speckled  --Lumbar spine Xrays ordered  --Rheumatology consulted  2/1:  - Remove espinosa for void trial - RN to bladder scan if does not void. If fails and retains, will reach out to Urology again   - Nephro to repeat UPCr  - Ongoing heme workup    - ID signed off - Continue PO Bactrim for 4 more days  - SW working on HH and DME - forms signed  1/31:  --PT has signed off due to her being bedbound for so long and unsafe to attempt standing or sitting  -- OT has recommended cock up wrists splints for hand/wrist pain  -- Hematology work-up in progress for cause of Leukopenia and anemia   --ULYSSES --positive awaiting reflex  --Day #3 Meropeneum D/C'd and oral Bactrim started today  --Increase Effexor from 37.5 to 75  --Pt continues to have Urinary catheter  --R buttock with skin tear noted on 1/29 1/30:  --Discussed with PT - Pt has not ambulated and needed full assist for over a year  - D#2 of Meropenem  - Ongoing Heme workup for worsening leukopenia -ULYSSES, flow cytometry, HUAN pending  - Pending urology  eval regarding urinary retention w/ espinosa  1/29:  -- proteinuria noted, large leuks/bacteria/hematuria in repeat u/a  -- espinosa placed yesterday, await urology recs  -- ID switched abx to meropenem  -- CR much better today  -- leukopenia worsened, consult lopez, Dr carpenter  -- reconsult PT/OT, discussed with PT and OT that patient is not bedbound and would benefit from eval and treatment during her stay  --  orders updated  1/28:  -- XR reviewed by vasc surg, no malformations, vasc surg signed off  -- MRI reviewed by ortho surg, no acute findings, ortho surg signed off  -- request PT/OT re-evaluation given neurologic findings  -- worsened creatinine, ct findings of thickened bladder wall and worsening leukopenia, decided to continue rocephin for 2 more days, despite contaminated culture, will also repeat u/a  -- renal ultrasound shows chronic renal medical disease with debris-filled urinary bladder; urinary retention; consult to urology, place espinosa  -- pathology smear pending, blood counts stable  --  RN ordered  -- SPEP for elevated globulin  1/27:  -- Ucx negative for UTI, dc rocephin  -- vascular surgery consulted for XR findings, appreciate recs  -- MRI brain reviewed  -- started lipitor, basa  -- repeat CTH without acute changes  -- CM consult for family help  -- HIDA scan wnl done for decreased appetite     Impression:  This is a 51 y/o female with PMH of hypertension, hyperlipidemia, and diabetes mellitus who presents following a fall at home today. Initial workup concerning for UTI; however, patient has also had neurological decline over the past 6 months.     Admission plan of care:  # Acute vs chronic encephalopathy, multifactorial etiology  # S/p mechanical fall at home  # Chronic bilateral basal ganglia lacunar infarcts  # Hx of balance issues  -- Neuro, Dr. Gilliam, re-consulted 2/3 - No further neurological testing recommended. Mentation likely multifactorial metabolic encephalopathy due to  Home non-neurologic causes.   -- CT head 1/26: no acute intracranial abnormality, atrophy and extensive chronic small vessel change  -CT cervical spine/lumbar spine 1/26: pending  -CXR 1/26: no acute process, right shoulder calcific tendinopathy  -bilateral shoulder x-ray 1/26: pending  -- TSH, b12, folate all wnl  -- MRI brain 1/27: no acute process, mild generalized volume loss greater than expected for patient's age, moderate chronic microvascular ischemic disease; chronic bilateral basal ganglia lacunar infarcts  -- PT/OT re-eval  -- CTH 1/27: no acute change  -- XR b/l wrist 1/26: no acute fracture or dislocation  -- carotid u/s 1/26: mild calcified atherosclerotic disease in bilateral carotid bulbs, no hemodynamically significant stenosis  -- continue bASA, statin  --  RN ordered     #SIRS (Tachycardia, fever (101.3), relative leukocytosis in setting of chronic leukopenia)  #Questionable leukopenic fever  --Last day of oral Bactrim   -- Bcx 2/2: NGTD  -- Lactic negative  -- ID to continue PO bactrim at this time     # Severe depression  -- psychiatry on consult, appreciate recs  -- started effexor 1/26, Increased dose on 1/31   -- neuropsych testing as outpatient     # Bilateral wrist pain  # Possible right radial AVM, ruled out  # Possible right radial aneurysm, ruled out  # Bilateral wrist atherosclerotic disease  -- vascular surgery on consult, Dr Bond, appreciate recs  -- XR bilateral wrist 1/26: right wrist deomnstrates serpentine area of calcification in length of radial artery concerning for peripheral AVM, 8.7 mm calcifiction in other region of radial artery may represent aneurysm, left wrist with atherosclerotic calcification of ulnar and radial arteries; mild degenerative narrowing of 1st MCPs with minute spurs  -- abnormalities ruled out by Dr Bond, vascular surgery, no further workup needed  -- lipid panel as below, continue statin and bASA  -- ULYSSES positive Rheumatology consult for possible SLE      #Normocytic anemia of chronic disease  #Leukopenia  Pt given a dose of Neopogen on 2/3 for fever with improvement in WBC  #Elevated globulin  -- hematology on consult, Dr Montague, appreciate recs  -- b12/folate wnl, iron panel c/w ACD, repeat iron panel stable  -- SPEP showed Polyclonal increase in gamma region is suggestive of chronic inflammatory condition   -- Flow on blood neg  -- PNH flow neg  -- Haptoglobin and LDH wnl, repeat also wnl  -- Positive ULYSSES   -- pathology smear 1/27: moderate leukopenia d/t absolute neutropenia, absolute lymphopenia, moderate normocytic anemia, hypochromic, moderate anisocytosis, occasional schistocytes  -- Hgb 6.5 -> s/p 1 unit PRBCs 2/3     # SHANE, suspect post-renal etiology due to retention  # Urinary retention  # Proteinuria and Hyperkalemia  -- Renal on consult  -- S/p calcium gluconate, Lokelma  # Bladder wall thickening  # Acute UTI  -- urology on consult, appreciate recs  -- ID on consult, appreciate recs  -- nephrology on consult, appreciate recs  -- UA 1/25: positive for large blood and large leukocyte  -- Urine Cx 1/25: contamination  -- rocephin 1/25-1/29, meropenem 1/29-1/31  -- 5 day PO Bactrim 1/31-present  -- UPC ratio c/w proteinuria, repeat pending  -- repeat u/a with large hematuria, pyuria, bacteriuria  -- renal u/s 1/28: chronic medical renal disease, urinary bladder is debris-filled  -- bladder scan retaining, espinosa 1/28-2/1. Removed for void trial.      # L1 age-indeterminate compression fracture  # Severe left neural foraminal stenosis at L4-L5  -- orthopedic surgery on consult, Dr Wynn, appreciate recs  -- CT lumbar spine 1/26: minimal age indeterminate L1 compression fracture  -- MRI lumbar spine: chronic L1 compression fracture, severe left neural foraminal stenosis at L4-L5  -- per ortho surg, no further treatment indicated at this time     # Hypertension  # Hyperlipidemia  -- continue home amlodipine  -- lipid panel: chol 153, HDL 37, LDL 81, TG  176  -- ASCVD 10-year risk 19% --> continue moderate intensity statin and daily baby aspirin     #Hypomagnesemia: Replaced  #Diabetes mellitus: Diabetic diet. Accuchecks and SSI  #Social: HH RN, PT, OT orders placed     #Additional orders:  -Monitor kidney function  -Monitor electrolytes and replace as indicated  -DVT prophylaxis         Soft, D5NS  Holding anticoags    Patient Active Problem List   Diagnosis    Type 2 diabetes mellitus without complication, without long-term current use of insulin (CMD)    Essential hypertension    AMS (altered mental status)       DVT ppx:   heparin (porcine) - 5000 UNIT/ML     Recent vitals:  Visit Vitals  BP (!) 142/84   Pulse 98   Temp 98.8 °F (37.1 °C) (Oral)   Resp 16   Ht 4' 11\" (1.499 m)   Wt 88.3 kg (194 lb 10.7 oz)   LMP 12/09/2014   SpO2 96%   BMI 39.32 kg/m²       Labs   Recent Labs   Lab 02/05/24  0600 02/04/24  0537 02/03/24 2329 02/03/24  1250 02/03/24  0956 02/02/24  2355 02/02/24  1414   SODIUM 138 137  --  135 132*  --   --    POTASSIUM 5.1 5.1 5.2* 5.2* 5.1   < > 5.4*   CHLORIDE 105 104  --  102 101  --   --    CO2 28 27  --  28 27  --   --    BUN 27* 29*  --  31* 31*  --   --    CREATININE 1.24* 1.35*  --  1.35* 1.34*  --   --    GLUCOSE 84 66*  --  72 72  --   --    CALCIUM 7.3* 7.5*  --  7.6* 7.8*  --   --    ALBUMIN 1.2* 1.4*  --  1.4* 1.3*  --   --    AST 22 19  --  21 21  --   --    MG 2.0  --   --   --  2.1  --  2.0    < > = values in this interval not displayed.     Recent Labs   Lab 02/05/24  0600 02/04/24  0537 02/03/24 2329 02/03/24  1250   WBC 9.6 9.1  --  3.1*   HGB 8.4* 8.3* 8.0* 6.5*   HCT 26.6* 26.0* 25.5* 20.9*    170  --  156       Recent Labs   Lab 02/03/24  1250 02/02/24  0150   INR 0.9 0.9        Recent Labs   Lab 02/04/24  1634 02/04/24  1946 02/05/24  0612 02/05/24  1123   GLUCOSE BEDSIDE 90 91 77 80        Cultures  Microbiology Results       None             Objective:   Head and neck:atraumatic  Affect: Flat  Mouth: Dry  Lungs:  There is no wheezing or crackles.   Heart: no M.   Abd: Soft, nontender, non distended.    Skin: Warm and dry.  Neuro: Symetric generalized weakness    Current Medications:  Current Facility-Administered Medications   Medication Dose Route Frequency Provider Last Rate Last Admin    sulfamethoxazole-trimethoprim (BACTRIM DS) 800-160 MG tablet 1 tablet  1 tablet Oral 2 times per day Laurie Clancy PA-C   1 tablet at 02/05/24 0614    venlafaxine XR (EFFEXOR XR) 24 hr capsule 75 mg  75 mg Oral Daily with breakfast Gissel Mackenzie MD   75 mg at 02/05/24 1027    [Held by provider] aspirin (ECOTRIN) enteric coated tablet 81 mg  81 mg Oral Daily Alma Talley PA-C   81 mg at 02/03/24 0855    atorvastatin (LIPITOR) tablet 20 mg  20 mg Oral Nightly Alma Talley PA-C   20 mg at 02/04/24 2132    amLODIPine (NORVASC) tablet 10 mg  10 mg Oral Daily Makeda Panda CNP   10 mg at 02/05/24 1027    sodium chloride 0.9 % injection 2 mL  2 mL Intracatheter 2 times per day Makeda Panda CNP   2 mL at 02/04/24 0900    [Held by provider] heparin (porcine) injection 5,000 Units  5,000 Units Subcutaneous 3 times per day Makeda Panda CNP   5,000 Units at 02/03/24 1316    Potassium Standard Replacement Protocol (Levels 3.5 and lower)   Does not apply See Admin Instructions Makeda Panda CNP        Magnesium Standard Replacement Protocol   Does not apply See Admin Instructions Makeda Panda CNP        insulin lispro (ADMELOG,HumaLOG) - Correction Dose   Subcutaneous TID WC Makeda Panda CNP        insulin lispro (ADMELOG,HumaLOG) - Correction Dose   Subcutaneous Nightly Makeda Panda CNP        cetirizine (ZyrTEC) tablet 10 mg  10 mg Oral Nightly Alma Talley PA-C   10 mg at 02/04/24 2133    gabapentin (NEURONTIN) capsule 100 mg  100 mg Oral 3 times per day Alma Talley PA-C   100 mg at 02/05/24 1312       Continuous Infusions:  Current Facility-Administered Medications   Medication  Dose Route Frequency Provider Last Rate Last Admin    sodium chloride 0.9% infusion   Intravenous Continuous PRN Stephany Roe PA-C        dextrose 5 % / sodium chloride 0.9% infusion   Intravenous Continuous Stephany Roe PA-C 75 mL/hr at 02/05/24 1021 Rate Verify at 02/05/24 1021       PRN Meds:.  Current Facility-Administered Medications   Medication Dose Route Frequency    sodium chloride 0.9% infusion   Intravenous Continuous PRN    hydrALAZINE (APRESOLINE) injection 10 mg  10 mg Intravenous Q6H PRN    sodium chloride 0.9 % flush bag 25 mL  25 mL Intravenous PRN    sodium chloride (NORMAL SALINE) 0.9 % bolus 500 mL  500 mL Intravenous PRN    dextrose 50 % injection 25 g  25 g Intravenous PRN    dextrose 50 % injection 12.5 g  12.5 g Intravenous PRN    glucagon (GLUCAGEN) injection 1 mg  1 mg Intramuscular PRN    dextrose (GLUTOSE) 40 % gel 15 g  15 g Oral PRN    dextrose (GLUTOSE) 40 % gel 30 g  30 g Oral PRN    acetaminophen (TYLENOL) tablet 650 mg  650 mg Oral Q4H PRN    Or    acetaminophen (TYLENOL) suppository 650 mg  650 mg Rectal Q4H PRN    ondansetron (ZOFRAN ODT) disintegrating tablet 4 mg  4 mg Oral Q12H PRN    Or    ondansetron (ZOFRAN) injection 4 mg  4 mg Intravenous Q12H PRN    polyethylene glycol (MIRALAX) packet 17 g  17 g Oral Daily PRN    docusate sodium-sennosides (SENOKOT S) 50-8.6 MG 2 tablet  2 tablet Oral BID PRN    bisacodyl (DULCOLAX) suppository 10 mg  10 mg Rectal Daily PRN    magnesium hydroxide (MILK OF MAGNESIA) 400 MG/5ML suspension 30 mL  30 mL Oral Daily PRN    melatonin tablet 3 mg  3 mg Oral Nightly PRN    HYDROcodone-acetaminophen (NORCO) 5-325 MG per tablet 1 tablet  1 tablet Oral Q4H PRN            Code Status    Code Status: Full Resuscitation      I spent greater than 45 minutes coordinating care, reviewing patient's diagnostic studies, examining patient, reviewing pertinent notes, collaborating with RNs/physicians/APNs/PAs involved in patient's care, determining management  plan, and discussing plan with patient at bedside.       This note was created using voice recognition technology. It may include inadvertent transcriptional errors. Any such errors should be contextually interpreted and should not be taken to alter the content or the meaning.   Note to Patient: The 21st Century Cures Act makes medical notes like these available to patients in the interest of transparency. However, be advised this is a medical document. It is intended as peer to peer communication. It is written in medical language and may contain abbreviations or verbiage that are unfamiliar. It may appear blunt or direct. Medical documents are intended to carry relevant information, facts as evident, and the clinical opinion of the practitioner.

## 2024-05-12 ENCOUNTER — EMERGENCY (EMERGENCY)
Facility: HOSPITAL | Age: 69
LOS: 1 days | Discharge: HOME | End: 2024-05-12
Attending: EMERGENCY MEDICINE
Payer: MEDICARE

## 2024-05-12 VITALS
WEIGHT: 110.89 LBS | DIASTOLIC BLOOD PRESSURE: 80 MMHG | TEMPERATURE: 98 F | RESPIRATION RATE: 18 BRPM | SYSTOLIC BLOOD PRESSURE: 134 MMHG | HEART RATE: 98 BPM | OXYGEN SATURATION: 98 % | HEIGHT: 62 IN

## 2024-05-12 DIAGNOSIS — Z90.49 ACQUIRED ABSENCE OF OTHER SPECIFIED PARTS OF DIGESTIVE TRACT: Chronic | ICD-10-CM

## 2024-05-12 PROCEDURE — 99285 EMERGENCY DEPT VISIT HI MDM: CPT

## 2024-05-12 NOTE — ED ADULT TRIAGE NOTE - CHIEF COMPLAINT QUOTE
pt reports that  c/o chest pain ,SOB , back pain associated with  x3 days  .pt feels blurry vision from 5 pm today.

## 2024-05-12 NOTE — ED ADULT TRIAGE NOTE - CCCP TRG CHIEF CMPLNT
Final Anesthesia Post-op Assessment    Patient: Alexis Hand  Procedure(s) Performed: CATARACT EXTRACTION WITH INTRAOCULAR LENS IMPLANTATION/RIGHT EYE TORIC WITH ORA - RIGHT  Anesthesia type: MAC    Vitals Value Taken Time   Temp 36.8 °C (98.3 °F) 04/07/21 0901   Pulse 60 04/07/21 0901   Resp 16 04/07/21 0901   SpO2 94 % 04/07/21 0901   /94 04/07/21 0901         Patient Location: Phase II  Post-op Vital Signs:stable  Level of Consciousness: participates in exam, alert, awake and oriented  Respiratory Status: spontaneous ventilation  Cardiovascular stable  Hydration: euvolemic  Pain Management: well controlled  Handoff: Handoff to receiving clinician was performed and questions were answered  Vomiting: none   Nausea: None  Airway Patency:patent  Post-op Assessment: awake, alert, appropriately conversant, or baseline, no complications and patient tolerated procedure well with no complications      No complications documented.   
difficulty breathing

## 2024-05-13 VITALS
HEART RATE: 85 BPM | RESPIRATION RATE: 18 BRPM | DIASTOLIC BLOOD PRESSURE: 63 MMHG | SYSTOLIC BLOOD PRESSURE: 110 MMHG | TEMPERATURE: 98 F | OXYGEN SATURATION: 96 %

## 2024-05-13 LAB
ALBUMIN SERPL ELPH-MCNC: 3.4 G/DL — LOW (ref 3.5–5)
ALP SERPL-CCNC: 62 U/L — SIGNIFICANT CHANGE UP (ref 40–120)
ALT FLD-CCNC: 66 U/L DA — HIGH (ref 10–60)
ANION GAP SERPL CALC-SCNC: 5 MMOL/L — SIGNIFICANT CHANGE UP (ref 5–17)
APPEARANCE UR: CLEAR — SIGNIFICANT CHANGE UP
AST SERPL-CCNC: 66 U/L — HIGH (ref 10–40)
BASOPHILS # BLD AUTO: 0 K/UL — SIGNIFICANT CHANGE UP (ref 0–0.2)
BASOPHILS NFR BLD AUTO: 0 % — SIGNIFICANT CHANGE UP (ref 0–2)
BILIRUB SERPL-MCNC: 0.6 MG/DL — SIGNIFICANT CHANGE UP (ref 0.2–1.2)
BILIRUB UR-MCNC: NEGATIVE — SIGNIFICANT CHANGE UP
BUN SERPL-MCNC: 10 MG/DL — SIGNIFICANT CHANGE UP (ref 7–18)
CALCIUM SERPL-MCNC: 8.3 MG/DL — LOW (ref 8.4–10.5)
CHLORIDE SERPL-SCNC: 109 MMOL/L — HIGH (ref 96–108)
CO2 SERPL-SCNC: 27 MMOL/L — SIGNIFICANT CHANGE UP (ref 22–31)
COLOR SPEC: YELLOW — SIGNIFICANT CHANGE UP
CREAT SERPL-MCNC: 0.39 MG/DL — LOW (ref 0.5–1.3)
D DIMER BLD IA.RAPID-MCNC: 224 NG/ML DDU — SIGNIFICANT CHANGE UP
DIFF PNL FLD: NEGATIVE — SIGNIFICANT CHANGE UP
EGFR: 106 ML/MIN/1.73M2 — SIGNIFICANT CHANGE UP
EOSINOPHIL # BLD AUTO: 0.04 K/UL — SIGNIFICANT CHANGE UP (ref 0–0.5)
EOSINOPHIL NFR BLD AUTO: 1 % — SIGNIFICANT CHANGE UP (ref 0–6)
GLUCOSE SERPL-MCNC: 97 MG/DL — SIGNIFICANT CHANGE UP (ref 70–99)
GLUCOSE UR QL: NEGATIVE MG/DL — SIGNIFICANT CHANGE UP
HCT VFR BLD CALC: 35.2 % — SIGNIFICANT CHANGE UP (ref 34.5–45)
HGB BLD-MCNC: 11.5 G/DL — SIGNIFICANT CHANGE UP (ref 11.5–15.5)
KETONES UR-MCNC: 40 MG/DL
LEUKOCYTE ESTERASE UR-ACNC: ABNORMAL
LYMPHOCYTES # BLD AUTO: 1.54 K/UL — SIGNIFICANT CHANGE UP (ref 1–3.3)
LYMPHOCYTES # BLD AUTO: 35 % — SIGNIFICANT CHANGE UP (ref 13–44)
MCHC RBC-ENTMCNC: 29.6 PG — SIGNIFICANT CHANGE UP (ref 27–34)
MCHC RBC-ENTMCNC: 32.7 GM/DL — SIGNIFICANT CHANGE UP (ref 32–36)
MCV RBC AUTO: 90.7 FL — SIGNIFICANT CHANGE UP (ref 80–100)
MONOCYTES # BLD AUTO: 0.31 K/UL — SIGNIFICANT CHANGE UP (ref 0–0.9)
MONOCYTES NFR BLD AUTO: 7 % — SIGNIFICANT CHANGE UP (ref 2–14)
NEUTROPHILS # BLD AUTO: 1.89 K/UL — SIGNIFICANT CHANGE UP (ref 1.8–7.4)
NEUTROPHILS NFR BLD AUTO: 43 % — SIGNIFICANT CHANGE UP (ref 43–77)
NITRITE UR-MCNC: NEGATIVE — SIGNIFICANT CHANGE UP
NT-PROBNP SERPL-SCNC: 210 PG/ML — HIGH (ref 0–125)
PH UR: 7.5 — SIGNIFICANT CHANGE UP (ref 5–8)
PLATELET # BLD AUTO: 158 K/UL — SIGNIFICANT CHANGE UP (ref 150–400)
POTASSIUM SERPL-MCNC: 3.4 MMOL/L — LOW (ref 3.5–5.3)
POTASSIUM SERPL-SCNC: 3.4 MMOL/L — LOW (ref 3.5–5.3)
PROT SERPL-MCNC: 6.9 G/DL — SIGNIFICANT CHANGE UP (ref 6–8.3)
PROT UR-MCNC: ABNORMAL MG/DL
RBC # BLD: 3.88 M/UL — SIGNIFICANT CHANGE UP (ref 3.8–5.2)
RBC # FLD: 14.9 % — HIGH (ref 10.3–14.5)
SODIUM SERPL-SCNC: 141 MMOL/L — SIGNIFICANT CHANGE UP (ref 135–145)
SP GR SPEC: 1.02 — SIGNIFICANT CHANGE UP (ref 1–1.03)
TROPONIN I, HIGH SENSITIVITY RESULT: 8.2 NG/L — SIGNIFICANT CHANGE UP
TROPONIN I, HIGH SENSITIVITY RESULT: 9.8 NG/L — SIGNIFICANT CHANGE UP
UROBILINOGEN FLD QL: 1 MG/DL — SIGNIFICANT CHANGE UP (ref 0.2–1)
WBC # BLD: 4.39 K/UL — SIGNIFICANT CHANGE UP (ref 3.8–10.5)
WBC # FLD AUTO: 4.39 K/UL — SIGNIFICANT CHANGE UP (ref 3.8–10.5)

## 2024-05-13 PROCEDURE — 36415 COLL VENOUS BLD VENIPUNCTURE: CPT

## 2024-05-13 PROCEDURE — 71045 X-RAY EXAM CHEST 1 VIEW: CPT | Mod: 26

## 2024-05-13 PROCEDURE — 83880 ASSAY OF NATRIURETIC PEPTIDE: CPT

## 2024-05-13 PROCEDURE — 84484 ASSAY OF TROPONIN QUANT: CPT

## 2024-05-13 PROCEDURE — 85025 COMPLETE CBC W/AUTO DIFF WBC: CPT

## 2024-05-13 PROCEDURE — 93005 ELECTROCARDIOGRAM TRACING: CPT

## 2024-05-13 PROCEDURE — 85379 FIBRIN DEGRADATION QUANT: CPT

## 2024-05-13 PROCEDURE — 93010 ELECTROCARDIOGRAM REPORT: CPT

## 2024-05-13 PROCEDURE — 99285 EMERGENCY DEPT VISIT HI MDM: CPT | Mod: 25

## 2024-05-13 PROCEDURE — 81001 URINALYSIS AUTO W/SCOPE: CPT

## 2024-05-13 PROCEDURE — 80053 COMPREHEN METABOLIC PANEL: CPT

## 2024-05-13 PROCEDURE — 71045 X-RAY EXAM CHEST 1 VIEW: CPT

## 2024-05-13 NOTE — ED ADULT NURSE NOTE - OBJECTIVE STATEMENT
Patient presented to ED C/o chest pain, SOB with exertion and rest, weakness, mild lightheadedness, decreased appetite, and nausea. History of cholecystectomy.

## 2024-05-13 NOTE — ED PROVIDER NOTE - NS ED ATTENDING STATEMENT MOD
Prescriptions electronically submitted to pharmacy from [facility] Take over the counter pain medication/Prescriptions electronically submitted to pharmacy from [facility] I have personally performed a face to face diagnostic evaluation on this patient. I have reviewed the ACP note and agree with the history, exam and plan of care, except as noted.

## 2024-05-13 NOTE — ED PROVIDER NOTE - NSFOLLOWUPINSTRUCTIONS_ED_ALL_ED_FT
Return to ER immediately if your chest pain returns, if you have pain with radiation to arms, back or neck, if you feel short of breath, feel weak, feel fast or pounding heart beating, if you have any fever or vomiting.  Return if you have any vision changes.   If you need assistance with follow up appointments our Care Coordinator can be reached at [phone]. In addition [facility] is located at [address] [place] [address], tel: [phone].

## 2024-05-13 NOTE — ED PROVIDER NOTE - OBJECTIVE STATEMENT
69-year-old female chief complaint of chest pain described as midsternal with radiation to the back started at 5 PM.  Patient also stated has blurry vision for 2 days.  Patient denies any nausea or vomiting, no reported fever.  No pain with eye movement.   No motor weakness.     Patient accompanied with adult nephnir Camarillo who patient requested as .

## 2024-05-13 NOTE — ED PROVIDER NOTE - PHYSICAL EXAMINATION
No distress  Extra ocular muscles intact, pupils 3mm and reactive to light, no photophobia, no pain with eye movement, no hyphema  OD/OS 20/50  OD IOP 8, OS IOP 6.

## 2024-05-13 NOTE — ED PROVIDER NOTE - PATIENT PORTAL LINK FT
You can access the FollowMyHealth Patient Portal offered by [facility] by registering at the following website: [email or web address]. By joining [facility]’s FollowMyHealth portal, you will also be able to view your health information using other applications (apps) compatible with our system.

## 2024-05-13 NOTE — ED PROVIDER NOTE - NSFOLLOWUPCLINICS_GEN_ALL_ED_FT
[facility]  Ophthalmology  [address], [address]  [place] [address]  Phone: [phone]  Fax:     [facility] [address] [facility]  Internal Medicine  [address] [place] [address]  Phone: [phone]  Fax: [phone]

## 2024-05-13 NOTE — ED PROVIDER NOTE - CLINICAL SUMMARY MEDICAL DECISION MAKING FREE TEXT BOX
5:11 AM patient is chest pain-free.  Patient adamantly requesting to be discharged.  Tropx 2 negative, D-dimer not elevated.  EKG normal sinus rhythm, no STEMI, no ischemic changes.  Chest x-ray shows no pneumothorax or consolidations.  Patient will follow-up with PMD.  I will provide contact for medical follow-up and Optho follow-up.  Pt is well appearing, has no new complaints and able to walk with normal gait. Pt is stable for discharge and follow up with medical doctor. Pt educated on care and need for follow up. Discussed anticipatory guidance and return precautions. Questions answered. I had a detailed discussion with the patient regarding the historical points, exam findings, and any diagnostic results supporting the discharge diagnosis.

## 2024-05-24 NOTE — ED PROVIDER NOTE - QTC
PAST SURGICAL HISTORY:  S/P left knee arthroscopy 2007    S/P ureteral stent placement left 2022    
454

## 2025-01-30 NOTE — ED ADULT NURSE NOTE - NSFALLOOBATTEMPT_ED_ALL_ED
Discontinue Regimen: tretinoin 0.1 % topical cream: Apply pea-sized amount to all acne-prone areas at bedtime starting every other night then increase to every night as tolerated
No
Detail Level: Simple
Render In Strict Bullet Format?: Yes
Initiate Treatment: \\n\\nSprintec

## (undated) DEVICE — TUBING ENDO EXT OLYMPUS 160 24HR USE GI

## (undated) DEVICE — ADAPTER ENDO CHNL SINGLE USE

## (undated) DEVICE — SNARE LOOP POLY DISP 30MM LOOP

## (undated) DEVICE — SYR LUER LOK 50CC

## (undated) DEVICE — FORCEP RADIAL JAW 4 W NDL 2.2MM 2.8MM 240CM ORANGE DISP

## (undated) DEVICE — DRSG CURITY GAUZE SPONGE 4 X 4" 12-PLY

## (undated) DEVICE — LUBRICATING JELLY ONESHOT 1.25OZ

## (undated) DEVICE — KIT ENDO PROCEDURE CUST W/VLV

## (undated) DEVICE — SOLIDIFIER CANN EXPRESS 3K

## (undated) DEVICE — TUBING MEDI-VAC W MAXIGRIP CONNECTORS 1/4"X6'

## (undated) DEVICE — VENODYNE/SCD SLEEVE CALF MEDIUM

## (undated) DEVICE — RETRIEVER ROTH NET PLATINUM-UNIVERSAL

## (undated) DEVICE — STERIS DEFENDO 3-PIECE KIT (AIR/WATER, SUCTION & BIOPSY VALVES)

## (undated) DEVICE — SENSOR O2 FINGER ADULT

## (undated) DEVICE — FORCEP BIOPSY 2.5MM DISP

## (undated) DEVICE — Device

## (undated) DEVICE — BITE BLOCK ADULT 20 X 27MM (GREEN)

## (undated) DEVICE — CONTAINER FORMALIN 10% 20ML

## (undated) DEVICE — CATH ELCTR GLIDE PRB 7FR

## (undated) DEVICE — CLAMP BX HOT RAD JAW 3

## (undated) DEVICE — TUBING CANNULA SALTER LABS NASAL ADULT 7FT

## (undated) DEVICE — TUBING IV SET GRAVITY 3Y 100" MACRO

## (undated) DEVICE — SOL INJ NS 0.9% 500ML 1-PORT

## (undated) DEVICE — NDL INJ SCLERO INTERJECT 23G